# Patient Record
Sex: FEMALE | Race: WHITE | NOT HISPANIC OR LATINO | URBAN - METROPOLITAN AREA
[De-identification: names, ages, dates, MRNs, and addresses within clinical notes are randomized per-mention and may not be internally consistent; named-entity substitution may affect disease eponyms.]

---

## 2020-05-18 ENCOUNTER — EMERGENCY (EMERGENCY)
Facility: HOSPITAL | Age: 70
LOS: 1 days | Discharge: ANOTHER HOSPITAL | End: 2020-05-18
Attending: EMERGENCY MEDICINE | Admitting: EMERGENCY MEDICINE
Payer: MEDICARE

## 2020-05-18 ENCOUNTER — INPATIENT (INPATIENT)
Facility: HOSPITAL | Age: 70
LOS: 6 days | Discharge: NURSING, REHAB OR LONG-TERM CARE | DRG: 314 | End: 2020-05-25
Attending: FAMILY MEDICINE | Admitting: HOSPITALIST
Payer: MEDICARE

## 2020-05-18 VITALS
WEIGHT: 190.04 LBS | HEART RATE: 84 BPM | RESPIRATION RATE: 18 BRPM | OXYGEN SATURATION: 94 % | HEIGHT: 58 IN | SYSTOLIC BLOOD PRESSURE: 100 MMHG | DIASTOLIC BLOOD PRESSURE: 67 MMHG | TEMPERATURE: 97 F

## 2020-05-18 VITALS
HEART RATE: 80 BPM | TEMPERATURE: 98 F | RESPIRATION RATE: 20 BRPM | SYSTOLIC BLOOD PRESSURE: 94 MMHG | OXYGEN SATURATION: 100 % | DIASTOLIC BLOOD PRESSURE: 56 MMHG

## 2020-05-18 VITALS
HEIGHT: 58 IN | OXYGEN SATURATION: 100 % | WEIGHT: 190.04 LBS | SYSTOLIC BLOOD PRESSURE: 114 MMHG | DIASTOLIC BLOOD PRESSURE: 55 MMHG | TEMPERATURE: 98 F | HEART RATE: 87 BPM | RESPIRATION RATE: 20 BRPM

## 2020-05-18 DIAGNOSIS — R06.02 SHORTNESS OF BREATH: ICD-10-CM

## 2020-05-18 DIAGNOSIS — N17.9 ACUTE KIDNEY FAILURE, UNSPECIFIED: ICD-10-CM

## 2020-05-18 DIAGNOSIS — I49.9 CARDIAC ARRHYTHMIA, UNSPECIFIED: ICD-10-CM

## 2020-05-18 DIAGNOSIS — J44.9 CHRONIC OBSTRUCTIVE PULMONARY DISEASE, UNSPECIFIED: ICD-10-CM

## 2020-05-18 DIAGNOSIS — E11.9 TYPE 2 DIABETES MELLITUS WITHOUT COMPLICATIONS: ICD-10-CM

## 2020-05-18 DIAGNOSIS — J96.01 ACUTE RESPIRATORY FAILURE WITH HYPOXIA: ICD-10-CM

## 2020-05-18 DIAGNOSIS — E87.1 HYPO-OSMOLALITY AND HYPONATREMIA: ICD-10-CM

## 2020-05-18 DIAGNOSIS — I10 ESSENTIAL (PRIMARY) HYPERTENSION: ICD-10-CM

## 2020-05-18 DIAGNOSIS — Z29.9 ENCOUNTER FOR PROPHYLACTIC MEASURES, UNSPECIFIED: ICD-10-CM

## 2020-05-18 DIAGNOSIS — D64.9 ANEMIA, UNSPECIFIED: ICD-10-CM

## 2020-05-18 DIAGNOSIS — I50.9 HEART FAILURE, UNSPECIFIED: ICD-10-CM

## 2020-05-18 DIAGNOSIS — E87.8 OTHER DISORDERS OF ELECTROLYTE AND FLUID BALANCE, NOT ELSEWHERE CLASSIFIED: ICD-10-CM

## 2020-05-18 DIAGNOSIS — R19.7 DIARRHEA, UNSPECIFIED: ICD-10-CM

## 2020-05-18 LAB
ABO RH CONFIRMATION: SIGNIFICANT CHANGE UP
ALBUMIN SERPL ELPH-MCNC: 3.4 G/DL — SIGNIFICANT CHANGE UP (ref 3.3–5)
ALP SERPL-CCNC: 131 U/L — HIGH (ref 30–120)
ALT FLD-CCNC: 14 U/L DA — SIGNIFICANT CHANGE UP (ref 10–60)
ANION GAP SERPL CALC-SCNC: 10 MMOL/L — SIGNIFICANT CHANGE UP (ref 5–17)
ANION GAP SERPL CALC-SCNC: 6 MMOL/L — SIGNIFICANT CHANGE UP (ref 5–17)
APTT BLD: 27.3 SEC — LOW (ref 28.5–37)
AST SERPL-CCNC: 23 U/L — SIGNIFICANT CHANGE UP (ref 10–40)
BASOPHILS # BLD AUTO: 0.03 K/UL — SIGNIFICANT CHANGE UP (ref 0–0.2)
BASOPHILS NFR BLD AUTO: 0.3 % — SIGNIFICANT CHANGE UP (ref 0–2)
BILIRUB SERPL-MCNC: 0.7 MG/DL — SIGNIFICANT CHANGE UP (ref 0.2–1.2)
BLD GP AB SCN SERPL QL: SIGNIFICANT CHANGE UP
BUN SERPL-MCNC: 90 MG/DL — HIGH (ref 7–23)
BUN SERPL-MCNC: 96 MG/DL — HIGH (ref 7–23)
CALCIUM SERPL-MCNC: 9.2 MG/DL — SIGNIFICANT CHANGE UP (ref 8.5–10.1)
CALCIUM SERPL-MCNC: 9.5 MG/DL — SIGNIFICANT CHANGE UP (ref 8.4–10.5)
CHLORIDE SERPL-SCNC: 81 MMOL/L — LOW (ref 96–108)
CHLORIDE SERPL-SCNC: 82 MMOL/L — LOW (ref 96–108)
CO2 SERPL-SCNC: 33 MMOL/L — HIGH (ref 22–31)
CO2 SERPL-SCNC: 37 MMOL/L — HIGH (ref 22–31)
CREAT SERPL-MCNC: 1.93 MG/DL — HIGH (ref 0.5–1.3)
CREAT SERPL-MCNC: 2.2 MG/DL — HIGH (ref 0.5–1.3)
CRP SERPL-MCNC: 0.98 MG/DL — HIGH (ref 0–0.4)
D DIMER BLD IA.RAPID-MCNC: <150 NG/ML DDU — SIGNIFICANT CHANGE UP
EOSINOPHIL # BLD AUTO: 0.04 K/UL — SIGNIFICANT CHANGE UP (ref 0–0.5)
EOSINOPHIL NFR BLD AUTO: 0.4 % — SIGNIFICANT CHANGE UP (ref 0–6)
FERRITIN SERPL-MCNC: 15 NG/ML — SIGNIFICANT CHANGE UP (ref 15–150)
GLUCOSE SERPL-MCNC: 106 MG/DL — HIGH (ref 70–99)
GLUCOSE SERPL-MCNC: 198 MG/DL — HIGH (ref 70–99)
HCT VFR BLD CALC: 23 % — LOW (ref 34.5–45)
HCT VFR BLD CALC: 23.6 % — LOW (ref 34.5–45)
HGB BLD-MCNC: 7.4 G/DL — LOW (ref 11.5–15.5)
HGB BLD-MCNC: 7.4 G/DL — LOW (ref 11.5–15.5)
IMM GRANULOCYTES NFR BLD AUTO: 0.6 % — SIGNIFICANT CHANGE UP (ref 0–1.5)
INR BLD: 1.26 RATIO — HIGH (ref 0.88–1.16)
LACTATE SERPL-SCNC: 1.2 MMOL/L — SIGNIFICANT CHANGE UP (ref 0.7–2)
LYMPHOCYTES # BLD AUTO: 1 K/UL — SIGNIFICANT CHANGE UP (ref 1–3.3)
LYMPHOCYTES # BLD AUTO: 9.3 % — LOW (ref 13–44)
MCHC RBC-ENTMCNC: 25.5 PG — LOW (ref 27–34)
MCHC RBC-ENTMCNC: 25.9 PG — LOW (ref 27–34)
MCHC RBC-ENTMCNC: 31.4 GM/DL — LOW (ref 32–36)
MCHC RBC-ENTMCNC: 32.2 GM/DL — SIGNIFICANT CHANGE UP (ref 32–36)
MCV RBC AUTO: 80.4 FL — SIGNIFICANT CHANGE UP (ref 80–100)
MCV RBC AUTO: 81.4 FL — SIGNIFICANT CHANGE UP (ref 80–100)
MONOCYTES # BLD AUTO: 1.02 K/UL — HIGH (ref 0–0.9)
MONOCYTES NFR BLD AUTO: 9.5 % — SIGNIFICANT CHANGE UP (ref 2–14)
NEUTROPHILS # BLD AUTO: 8.59 K/UL — HIGH (ref 1.8–7.4)
NEUTROPHILS NFR BLD AUTO: 79.9 % — HIGH (ref 43–77)
NRBC # BLD: 0 /100 WBCS — SIGNIFICANT CHANGE UP (ref 0–0)
NRBC # BLD: 0 /100 WBCS — SIGNIFICANT CHANGE UP (ref 0–0)
NT-PROBNP SERPL-SCNC: 5421 PG/ML — HIGH (ref 0–125)
PLATELET # BLD AUTO: 253 K/UL — SIGNIFICANT CHANGE UP (ref 150–400)
PLATELET # BLD AUTO: 280 K/UL — SIGNIFICANT CHANGE UP (ref 150–400)
POTASSIUM SERPL-MCNC: 3 MMOL/L — LOW (ref 3.5–5.3)
POTASSIUM SERPL-MCNC: 3 MMOL/L — LOW (ref 3.5–5.3)
POTASSIUM SERPL-SCNC: 3 MMOL/L — LOW (ref 3.5–5.3)
POTASSIUM SERPL-SCNC: 3 MMOL/L — LOW (ref 3.5–5.3)
PROCALCITONIN SERPL-MCNC: 0.22 NG/ML — HIGH (ref 0.02–0.1)
PROT SERPL-MCNC: 7.5 G/DL — SIGNIFICANT CHANGE UP (ref 6–8.3)
PROTHROM AB SERPL-ACNC: 14.1 SEC — HIGH (ref 10–12.9)
RBC # BLD: 2.86 M/UL — LOW (ref 3.8–5.2)
RBC # BLD: 2.9 M/UL — LOW (ref 3.8–5.2)
RBC # FLD: 15 % — HIGH (ref 10.3–14.5)
RBC # FLD: 15.1 % — HIGH (ref 10.3–14.5)
SODIUM SERPL-SCNC: 124 MMOL/L — LOW (ref 135–145)
SODIUM SERPL-SCNC: 125 MMOL/L — LOW (ref 135–145)
TROPONIN I SERPL-MCNC: 0 NG/ML — LOW (ref 0.02–0.06)
WBC # BLD: 10.72 K/UL — HIGH (ref 3.8–10.5)
WBC # BLD: 10.74 K/UL — HIGH (ref 3.8–10.5)
WBC # FLD AUTO: 10.72 K/UL — HIGH (ref 3.8–10.5)
WBC # FLD AUTO: 10.74 K/UL — HIGH (ref 3.8–10.5)

## 2020-05-18 PROCEDURE — 93005 ELECTROCARDIOGRAM TRACING: CPT

## 2020-05-18 PROCEDURE — 71250 CT THORAX DX C-: CPT | Mod: 26

## 2020-05-18 PROCEDURE — 71045 X-RAY EXAM CHEST 1 VIEW: CPT

## 2020-05-18 PROCEDURE — 99285 EMERGENCY DEPT VISIT HI MDM: CPT | Mod: 25

## 2020-05-18 PROCEDURE — U0003: CPT

## 2020-05-18 PROCEDURE — 83880 ASSAY OF NATRIURETIC PEPTIDE: CPT

## 2020-05-18 PROCEDURE — 80053 COMPREHEN METABOLIC PANEL: CPT

## 2020-05-18 PROCEDURE — 93010 ELECTROCARDIOGRAM REPORT: CPT

## 2020-05-18 PROCEDURE — 85610 PROTHROMBIN TIME: CPT

## 2020-05-18 PROCEDURE — 82728 ASSAY OF FERRITIN: CPT

## 2020-05-18 PROCEDURE — 87040 BLOOD CULTURE FOR BACTERIA: CPT

## 2020-05-18 PROCEDURE — 96360 HYDRATION IV INFUSION INIT: CPT

## 2020-05-18 PROCEDURE — 36415 COLL VENOUS BLD VENIPUNCTURE: CPT

## 2020-05-18 PROCEDURE — 85730 THROMBOPLASTIN TIME PARTIAL: CPT

## 2020-05-18 PROCEDURE — 99285 EMERGENCY DEPT VISIT HI MDM: CPT

## 2020-05-18 PROCEDURE — 99223 1ST HOSP IP/OBS HIGH 75: CPT | Mod: GC,AI

## 2020-05-18 PROCEDURE — 85379 FIBRIN DEGRADATION QUANT: CPT

## 2020-05-18 PROCEDURE — 74176 CT ABD & PELVIS W/O CONTRAST: CPT | Mod: 26

## 2020-05-18 PROCEDURE — 99284 EMERGENCY DEPT VISIT MOD MDM: CPT

## 2020-05-18 PROCEDURE — 86140 C-REACTIVE PROTEIN: CPT

## 2020-05-18 PROCEDURE — 84484 ASSAY OF TROPONIN QUANT: CPT

## 2020-05-18 PROCEDURE — 85027 COMPLETE CBC AUTOMATED: CPT

## 2020-05-18 PROCEDURE — 83605 ASSAY OF LACTIC ACID: CPT

## 2020-05-18 PROCEDURE — 82962 GLUCOSE BLOOD TEST: CPT

## 2020-05-18 PROCEDURE — 71045 X-RAY EXAM CHEST 1 VIEW: CPT | Mod: 26

## 2020-05-18 PROCEDURE — 84145 PROCALCITONIN (PCT): CPT

## 2020-05-18 RX ORDER — DILTIAZEM HCL 120 MG
120 CAPSULE, EXT RELEASE 24 HR ORAL DAILY
Refills: 0 | Status: DISCONTINUED | OUTPATIENT
Start: 2020-05-18 | End: 2020-05-18

## 2020-05-18 RX ORDER — ESOMEPRAZOLE MAGNESIUM 40 MG/1
1 CAPSULE, DELAYED RELEASE ORAL
Qty: 0 | Refills: 0 | DISCHARGE

## 2020-05-18 RX ORDER — FOLIC ACID 0.8 MG
1 TABLET ORAL DAILY
Refills: 0 | Status: DISCONTINUED | OUTPATIENT
Start: 2020-05-18 | End: 2020-05-25

## 2020-05-18 RX ORDER — FOLIC ACID 0.8 MG
1 TABLET ORAL
Qty: 0 | Refills: 0 | DISCHARGE

## 2020-05-18 RX ORDER — POTASSIUM CHLORIDE 20 MEQ
10 PACKET (EA) ORAL
Refills: 0 | Status: COMPLETED | OUTPATIENT
Start: 2020-05-18 | End: 2020-05-19

## 2020-05-18 RX ORDER — INSULIN LISPRO 100/ML
VIAL (ML) SUBCUTANEOUS
Refills: 0 | Status: DISCONTINUED | OUTPATIENT
Start: 2020-05-18 | End: 2020-05-25

## 2020-05-18 RX ORDER — SODIUM CHLORIDE 9 MG/ML
1000 INJECTION, SOLUTION INTRAVENOUS
Refills: 0 | Status: DISCONTINUED | OUTPATIENT
Start: 2020-05-18 | End: 2020-05-25

## 2020-05-18 RX ORDER — POTASSIUM CHLORIDE 20 MEQ
40 PACKET (EA) ORAL ONCE
Refills: 0 | Status: COMPLETED | OUTPATIENT
Start: 2020-05-18 | End: 2020-05-18

## 2020-05-18 RX ORDER — DEXTROSE 50 % IN WATER 50 %
25 SYRINGE (ML) INTRAVENOUS ONCE
Refills: 0 | Status: DISCONTINUED | OUTPATIENT
Start: 2020-05-18 | End: 2020-05-25

## 2020-05-18 RX ORDER — DEXTROSE 50 % IN WATER 50 %
12.5 SYRINGE (ML) INTRAVENOUS ONCE
Refills: 0 | Status: DISCONTINUED | OUTPATIENT
Start: 2020-05-18 | End: 2020-05-25

## 2020-05-18 RX ORDER — DEXTROSE 50 % IN WATER 50 %
15 SYRINGE (ML) INTRAVENOUS ONCE
Refills: 0 | Status: DISCONTINUED | OUTPATIENT
Start: 2020-05-18 | End: 2020-05-25

## 2020-05-18 RX ORDER — GLUCAGON INJECTION, SOLUTION 0.5 MG/.1ML
1 INJECTION, SOLUTION SUBCUTANEOUS ONCE
Refills: 0 | Status: DISCONTINUED | OUTPATIENT
Start: 2020-05-18 | End: 2020-05-25

## 2020-05-18 RX ORDER — SERTRALINE 25 MG/1
1 TABLET, FILM COATED ORAL
Qty: 0 | Refills: 0 | DISCHARGE

## 2020-05-18 RX ORDER — IPRATROPIUM/ALBUTEROL SULFATE 18-103MCG
3 AEROSOL WITH ADAPTER (GRAM) INHALATION EVERY 6 HOURS
Refills: 0 | Status: DISCONTINUED | OUTPATIENT
Start: 2020-05-18 | End: 2020-05-25

## 2020-05-18 RX ORDER — DILTIAZEM HCL 120 MG
1 CAPSULE, EXT RELEASE 24 HR ORAL
Qty: 0 | Refills: 0 | DISCHARGE

## 2020-05-18 RX ORDER — FLUTICASONE FUROATE, UMECLIDINIUM BROMIDE AND VILANTEROL TRIFENATATE 200; 62.5; 25 UG/1; UG/1; UG/1
1 POWDER RESPIRATORY (INHALATION)
Qty: 0 | Refills: 0 | DISCHARGE

## 2020-05-18 RX ORDER — METFORMIN HYDROCHLORIDE 850 MG/1
1 TABLET ORAL
Qty: 0 | Refills: 0 | DISCHARGE

## 2020-05-18 RX ORDER — CETIRIZINE HYDROCHLORIDE 10 MG/1
1 TABLET ORAL
Qty: 0 | Refills: 0 | DISCHARGE

## 2020-05-18 RX ORDER — SODIUM CHLORIDE 9 MG/ML
1000 INJECTION INTRAMUSCULAR; INTRAVENOUS; SUBCUTANEOUS ONCE
Refills: 0 | Status: COMPLETED | OUTPATIENT
Start: 2020-05-18 | End: 2020-05-18

## 2020-05-18 RX ORDER — PANTOPRAZOLE SODIUM 20 MG/1
40 TABLET, DELAYED RELEASE ORAL EVERY 12 HOURS
Refills: 0 | Status: DISCONTINUED | OUTPATIENT
Start: 2020-05-18 | End: 2020-05-25

## 2020-05-18 RX ORDER — DILTIAZEM HCL 120 MG
120 CAPSULE, EXT RELEASE 24 HR ORAL DAILY
Refills: 0 | Status: DISCONTINUED | OUTPATIENT
Start: 2020-05-18 | End: 2020-05-25

## 2020-05-18 RX ORDER — SERTRALINE 25 MG/1
100 TABLET, FILM COATED ORAL DAILY
Refills: 0 | Status: DISCONTINUED | OUTPATIENT
Start: 2020-05-18 | End: 2020-05-25

## 2020-05-18 RX ORDER — CHOLECALCIFEROL (VITAMIN D3) 125 MCG
1000 CAPSULE ORAL DAILY
Refills: 0 | Status: DISCONTINUED | OUTPATIENT
Start: 2020-05-18 | End: 2020-05-22

## 2020-05-18 RX ORDER — FERROUS SULFATE 325(65) MG
325 TABLET ORAL DAILY
Refills: 0 | Status: DISCONTINUED | OUTPATIENT
Start: 2020-05-18 | End: 2020-05-25

## 2020-05-18 RX ORDER — ACETAMINOPHEN 500 MG
650 TABLET ORAL ONCE
Refills: 0 | Status: COMPLETED | OUTPATIENT
Start: 2020-05-18 | End: 2020-05-18

## 2020-05-18 RX ORDER — INSULIN LISPRO 100/ML
VIAL (ML) SUBCUTANEOUS AT BEDTIME
Refills: 0 | Status: DISCONTINUED | OUTPATIENT
Start: 2020-05-18 | End: 2020-05-25

## 2020-05-18 RX ADMIN — Medication 40 MILLIEQUIVALENT(S): at 17:39

## 2020-05-18 RX ADMIN — Medication 100 MILLIEQUIVALENT(S): at 21:32

## 2020-05-18 RX ADMIN — SODIUM CHLORIDE 1000 MILLILITER(S): 9 INJECTION INTRAMUSCULAR; INTRAVENOUS; SUBCUTANEOUS at 17:30

## 2020-05-18 RX ADMIN — SODIUM CHLORIDE 1000 MILLILITER(S): 9 INJECTION INTRAMUSCULAR; INTRAVENOUS; SUBCUTANEOUS at 16:30

## 2020-05-18 RX ADMIN — PANTOPRAZOLE SODIUM 40 MILLIGRAM(S): 20 TABLET, DELAYED RELEASE ORAL at 21:32

## 2020-05-18 RX ADMIN — Medication 650 MILLIGRAM(S): at 17:39

## 2020-05-18 RX ADMIN — Medication 20 MILLIGRAM(S): at 22:29

## 2020-05-18 RX ADMIN — Medication 0: at 21:35

## 2020-05-18 NOTE — H&P ADULT - NSICDXPASTMEDICALHX_GEN_ALL_CORE_FT
PAST MEDICAL HISTORY:  COPD (Chronic Obstructive Pulmonary Disease)     COPD with hypoxia     Diabetes Mellitus Type II     History of Cholecystectomy     HTN (Hypertension)     Hypertension     Obstructive Sleep Apnea     Pulmonary hypertension     Type 2 diabetes mellitus

## 2020-05-18 NOTE — H&P ADULT - PROBLEM SELECTOR PLAN 7
Chronic HFpEF, with ?pulm HTN found on CT  - Continue Torsemide 20 mg TID, will hold tomorrow if Cr worsens as d/w Dr Staton  - Continue Sildenafil  - Continue Cardizem  - Hold metolazone and spironolactone per Dr Staton  - Last documented TTE 5/2012 on our records; f/u echo  - Cardio (Dr Staton) consulted, recs appreciated

## 2020-05-18 NOTE — H&P ADULT - PROBLEM SELECTOR PLAN 9
Hx of "arrhythmia" - pt/daughter do not know which type, ?Afib  - Continue Cardizem  - Monitor on tele  - Cardio (Dr Staton) consulted

## 2020-05-18 NOTE — H&P ADULT - HISTORY OF PRESENT ILLNESS
70 year old Female with PMHx of COPD (on home O2 via NC), chronic HFpEF, HTN, DM2 who presented with c/o SOB x3 days, +non productive cough and chronic lower extremity edema. She denies fevers/chills, no known COVID exposures, chest pain, abdominal pain, v/d, chest pain, palpitations.     [facility] [phone]   Torsemide 40 tid  Esomeprazole 40mg daily, FA 1tab, 325mg Fe daily, Metformin 500mg ER daily, MVI, vitamin D 1000mg daily, Zyrtec   Zoloft 100mg daily  Metalazone 2.5mg daily   Spironolactone 25mg daily   sildenafil 150mg  diltiazem 120mg daily   Combivent  Singulair 2014     Called Daughter on phone    Symptoms started about 2 to 3 weeks ago. Patient became more short of breath to where she her legs were swollen ,left breast tissue swollen, with overall tense swollen sign."filled with water". Daughters called her cardiology, prescribed a second diuretic metazolone along with torsemide 20 mg TID.   As of recent patient has been very lethargic, no energy, + chest discomfort. Continued to be progressively more dyspneic. Daughter gave her nasal saline for breathing. not  effective. her sugars were checked, overal elevated in the low 200s    2 days ago, daughter called her cardiologist, [place] patient was over diuresed and stopped her diuretics for the past two days.    Patient was transferred from [place] ED on 5/18 with the following vitals:   T: 97.4, HR 84, /67, RR: 18, SpO2 94% on non-rebreather.  CBC significant for Hgb 7.4 (in 2014 hgb 13.2), D-dimer <150. INR/PT/Ptt 1.26/14.1/27.3. CMP sig for Na 124-->125 s/p 1L NS at [facility] ED, K 3.0 (3.0 in pl[facility] ED s/p 40KCl PO x1), Cl 81, CO2 37, BUN/Cr 96/1.93-->90/2.20 (in [facility] ED). Trops x 1 negative. Serum BNP 5421 5421. Lactate negative  CXR: The lungs are clear. The heart size is mildly enlarged. Multilevel degenerative changes are noted within the imaged potions of the spine.  EKG:   COVID 19 PCR in process  Blood culture, CRP and Procalcitonin are in process  Meds given: tylenol 650, NS 1 liter bolus, 40KCl PO 70 year old Female with PMHx of COPD (on home O2 via NC 5-6 L), chronic HFpEF (last documented TTE 5/2012), HTN, DM2, Mood disorder who presented with c/o SOB x3 days, +non productive cough and chronic lower extremity edema. She denies fevers/chills, no known COVID exposures, chest pain, abdominal pain, v/d, chest pain, palpitations.     [facility] [phone]   Torsemide 40 tid  Esomeprazole 40mg daily, FA 1tab, 325mg Fe daily, Metformin 500mg ER daily, MVI, vitamin D 1000mg daily, Zyrtec   Zoloft 100mg daily  Metolazone 2.5mg daily   Spironolactone 25mg daily   sildenafil 150mg  diltiazem 120mg daily   Combivent  Singulair 2014     Called Daughter on phone    Symptoms started about 2 to 3 weeks ago. Patient became more short of breath to where she her legs were swollen ,left breast tissue swollen, with overall tense swollen sign."filled with water". Daughters called her cardiology, prescribed a second diuretic metazolone along with torsemide 20 mg TID.   As of recent patient has been very lethargic, no energy, + chest discomfort. Continued to be progressively more dyspneic. Daughter gave her nasal saline for breathing. not  effective. her sugars were checked, overal elevated in the low 200s    2 days ago, daughter called her cardiologist, [place] patient was over diuresed and stopped her diuretics for the past two days.    Patient was transferred from [place] ED on 5/18 with the following vitals:   T: 97.4, HR 84, /67, RR: 18, SpO2 94% on non-rebreather.  CBC significant for Hgb 7.4 (in 2014 hgb 13.2), D-dimer <150. INR/PT/Ptt 1.26/14.1/27.3. CMP sig for Na 124-->125 s/p 1L NS at [facility] ED, K 3.0 (3.0 in plv ED s/p 40KCl PO x1), Cl 81, CO2 37, BUN/Cr 96/1.93-->90/2.20 (in pl[facility] ED). Trops x 1 negative. Serum BNP 5421 5421. Lactate negative  CXR: The lungs are clear. The heart size is mildly enlarged. Multilevel degenerative changes are noted within the imaged potions of the spine.  EKG:   COVID 19 PCR in process  Blood culture, CRP and Procalcitonin are in process  Meds given: tylenol 650, NS 1 liter bolus, 40KCl PO 70 year old Female with PMHx of COPD (on home O2 via NC 5-6 L), chronic HFpEF (last documented TTE 5/2012), HTN, DM2, anxiety who presented with c/o SOB  and fatigue for four days. Patient also describes having a cough productive of white sputum, worsening lower extremity edema, left chest discomfort, and nausea. Every day her symptoms get worse, and are especially bad when she is trying to get around the house. Patient is on 5-6L O2 at home typically saturating in the low 90s. Daughter gave her nasal saline for breathing, which was not effective. With worsening SOB and LE edema, daughters called her cardiologist who prescribed a second diuretic metazolone along with torsemide 20 mg TID. Two days ago, daughter called her cardiologist, [place] patient was over diuresed and stopped her diuretics for the past two days. She denies fevers/chills, headache, vomiting, sick contacts.         Patient was transferred from [facility] ED on 5/18 with the following vitals:   T: 97.4, HR 84, /67, RR: 18, SpO2 94% on non-rebreather.  CBC significant for Hgb 7.4 (in 2014 hgb 13.2), D-dimer <150. INR/PT/Ptt 1.26/14.1/27.3. CMP sig for Na 124-->125 s/p 1L NS at [facility] ED, K 3.0 (3.0 in plv ED s/p 40KCl PO x1), Cl 81, CO2 37, BUN/Cr 96/1.93-->90/2.20 (in plv ED). Trops x 1 negative. Serum BNP 5421 5421. Lactate negative  CXR: The lungs are clear. The heart size is mildly enlarged. Multilevel degenerative changes are noted within the imaged potions of the spine.  EKG:   COVID 19 PCR in process  Blood culture, CRP and Procalcitonin are in process  Meds given: tylenol 650, NS 1 liter bolus, 40KCl PO 70 year old Female with PMHx of COPD (on home O2 via NC 5-6 L), chronic HFpEF (last documented TTE 5/2012), HTN, DM2, anxiety who presented with c/o SOB  and fatigue for four days. Patient also describes having a cough productive of white sputum, worsening lower extremity edema, left chest discomfort, and nausea. Every day her symptoms get worse, and are especially bad when she is trying to get around the house. Patient is on 5-6L O2 at home typically saturating in the low 90s. Daughter gave her nasal saline for breathing, which was not effective. With worsening SOB and LE edema, daughters called her cardiologist who prescribed a second diuretic metazolone along with torsemide 20 mg TID. Two days ago, daughter called her cardiologist, [place] patient was over diuresed and stopped her diuretics for the past two days. She denies fevers/chills, headache, vomiting, sick contacts.         Patient was transferred from [place] ED on 5/18 with the following vitals:   T: 97.4, HR 84, /67, RR: 18, SpO2 94% on non-rebreather.  CBC significant for Hgb 7.4 (in 2014 hgb 13.2), D-dimer <150. INR/PT/Ptt 1.26/14.1/27.3. CMP sig for Na 124-->125 s/p 1L NS at [place] ED, K 3.0 (3.0 in plv ED s/p 40KCl PO x1), Cl 81, CO2 37, BUN/Cr 96/1.93-->90/2.20 (in plv ED). Trops x 1 negative. Serum BNP 5421 5421. Lactate negative  CXR: The lungs are clear. The heart size is mildly enlarged. Multilevel degenerative changes are noted within the imaged potions of the spine.  EKG: NSR with RBBB per cardio note  COVID 19 PCR in process  Blood culture, CRP and Procalcitonin are in process  Meds given: tylenol 650, NS 1 liter bolus, 40KCl PO

## 2020-05-18 NOTE — ED PROVIDER NOTE - ATTENDING CONTRIBUTION TO CARE
70 female bIBA for eval of weakness, dry cough and watery diarrhea for several days. Denies contact with covid pts. EMS states pt hypoxic on room air. Sats 100% on NRB. PE afebrile, scattered rhonchi, trace pitting edema in ankles. Abd soft.   PCP Mendelson  Plan - COVID workup.    I performed a history and physical exam of the patient and discussed their management with the advanced care provider. I reviewed the advanced care provider's note and agree with the documented findings and plan of care. My medical decision making and objective findings are found above.

## 2020-05-18 NOTE — H&P ADULT - PROBLEM SELECTOR PLAN 5
?Mild pancreatitis on CT abdomen/pelvis with abdominal discomfort on exam  - F/u lipase ?Mild pancreatitis on CT abdomen/pelvis with abdominal discomfort on exam  - lipase 91 making pancreatitis unlikely.

## 2020-05-18 NOTE — ED PROVIDER NOTE - CARE PLAN
Principal Discharge DX:	Hyponatremia  Secondary Diagnosis:	MAURICIO (acute kidney injury)  Secondary Diagnosis:	Hypokalemia  Secondary Diagnosis:	Anemia  Secondary Diagnosis:	Hypoxia

## 2020-05-18 NOTE — H&P ADULT - NSHPOUTPATIENTPROVIDERS_GEN_ALL_CORE
Pulmonologist:   Cardiologist: Dr. Mendelson in [facility] PCP: Alejandro  Cardiologist: Dr. Mendelson in [facility]

## 2020-05-18 NOTE — H&P ADULT - ASSESSMENT
70 year old Female with PMHx of COPD (not on home O2), HTN, DM2 who presented with c/o SOB x3 days, +non productive cough and chronic lower extremity edema, admitted for acute hypoxemic respiratory failure 2/2 COPD exacerbation vs. volume overload from acute diastolic heart failure vs. symptomatic anemia, complicated by MAURICIO, hyponatremia.     Impression  Hypoxemic Respiratory Failure ddx include COPD exacerbation vs symptomatic anemia vs congestive Heart failurer  r/o COVID 19 PNA  CXR clear  COVID in process  on nonrebreather  plan:  urine antigens  start Ceftriaxone and doxycycline  start albuterol inhaler  start IV steroids  ICU evaluation  CT Chest non con now  venous duplex  trend trops  echocardiogram    Cough / Diarrhea r/o viral Illness r/o Cdiff  COVID pending  check viral PCR / FLU  check cdiff and GI PCR    Anemia likely to Iron Defiency vs ?hemmorhoidal  As per daughter, aid reports blood in patients stool.  Patients physicians reports she has chronic rectal bleeding  may be contributing to hypoxemia  PRBC ordered  active type and cross  Guiac now    Hyponatremia ddx include SIADH vs Dehydration  S/p 1L bolus of normal saline  Repeat Chemistrie  urine lytes    MAURICIO likely prerenal azotemia  bladder renal sono  avoid nephrotoxic drugs    Code: full  diet: NPO while on nonBreather 70 year old Female with PMHx of COPD (on home O2 via NC 5-6 L), chronic HFpEF (last documented TTE 5/2012), HTN, DM2, Mood disorder who presented with c/o SOB x3 days, +non productive cough and chronic lower extremity edema, admitted for acute hypoxemic respiratory failure 2/2 COPD exacerbation vs. volume overload from acute diastolic heart failure vs. symptomatic anemia, complicated by MAURICIO, hyponatremia.     Impression  Hypoxemic Respiratory Failure ddx include COPD exacerbation vs symptomatic anemia vs congestive Heart failurer  r/o COVID 19 PNA  CXR clear  COVID in process  on nonrebreather  plan:  urine antigens  start Ceftriaxone and doxycycline  start albuterol inhaler  start IV steroids  ICU evaluation  CT Chest non con now  venous duplex  trend trops  echocardiogram    Cough / Diarrhea r/o viral Illness r/o Cdiff  COVID pending  check viral PCR / FLU  check cdiff and GI PCR    Anemia likely to Iron Defiency vs ?hemmorhoidal  As per daughter, aid reports blood in patients stool.  Patients physicians reports she has chronic rectal bleeding  may be contributing to hypoxemia  PRBC ordered  active type and cross  Guiac now    Hyponatremia ddx include SIADH vs Dehydration  S/p 1L bolus of normal saline  Repeat Chemistrie  urine lytes    MAURICIO likely prerenal azotemia  bladder renal sono  avoid nephrotoxic drugs    Code: full  diet: NPO while on nonBreather 70 year old Female with PMHx of COPD (on home O2 via NC 5-6 L), chronic HFpEF (last documented TTE 5/2012), HTN, DM2, Mood disorder who presented with c/o SOB x3 days, +non productive cough and chronic lower extremity edema, admitted for acute hypoxemic respiratory failure 2/2 COPD exacerbation vs. volume overload from acute diastolic heart failure vs. symptomatic anemia, complicated by MAURICIO, hyponatremia.     Impression  Hypoxemic Respiratory Failure ddx include COPD exacerbation vs symptomatic anemia vs congestive Heart failure  r/o COVID 19 PNA  CXR clear  COVID in process  on nonrebreather  plan:  urine antigens  start Ceftriaxone and doxycycline  start albuterol inhaler  start IV steroids  ICU evaluation  CT Chest non con now  venous duplex  trend trops  echocardiogram    Cough / Diarrhea r/o viral Illness r/o Cdiff  COVID pending  check viral PCR / FLU  check cdiff and GI PCR    Anemia likely to Iron Defiency vs ?hemmorhoidal  As per daughter, aid reports blood in patients stool.  Patients physicians reports she has chronic rectal bleeding  may be contributing to hypoxemia  PRBC ordered  active type and cross  Guiac now    Hyponatremia ddx include SIADH vs Dehydration  S/p 1L bolus of normal saline  Repeat Chemistrie  urine lytes    MAURICIO likely prerenal azotemia  bladder renal sono  avoid nephrotoxic drugs    Code: full  diet: NPO while on nonBreather 70 year old Female with PMHx of COPD (on home O2 via NC 5-6 L), chronic HFpEF (last documented TTE 5/2012), HTN, DM2, Mood disorder who presented with c/o SOB x3 days, +non productive cough and chronic lower extremity edema, admitted for acute hypoxemic respiratory failure 2/2 COPD exacerbation vs. volume overload from acute diastolic heart failure vs. symptomatic anemia, complicated by MAURICIO, hyponatremia.

## 2020-05-18 NOTE — H&P ADULT - NSHPPHYSICALEXAM_GEN_ALL_CORE
T(C): 36.4 (05-18-20 @ 19:27), Max: 36.4 (05-18-20 @ 19:27)  HR: 87 (05-18-20 @ 19:27) (87 - 87)  BP: 114/55 (05-18-20 @ 19:27) (114/55 - 114/55)  RR: 20 (05-18-20 @ 19:27) (20 - 20)  SpO2: 100% (05-18-20 @ 19:27) (100% - 100%)    GENERAL: patient appears well, no acute distress, appropriate, pleasant  EYES: sclera clear, no exudates  ENMT: oropharynx clear without erythema, no exudates, moist mucous membranes  NECK: supple, soft, no thyromegaly noted  LUNGS: good air entry bilaterally, clear to auscultation, symmetric breath sounds, no wheezing or rhonchi appreciated  HEART: soft S1/S2, regular rate and rhythm, no murmurs noted, no lower extremity edema  GASTROINTESTINAL: abdomen is soft, nontender, nondistended, normoactive bowel sounds, no palpable masses  INTEGUMENT: good skin turgor, no lesions noted  MUSCULOSKELETAL: no clubbing or cyanosis, no obvious deformity  NEUROLOGIC: awake, alert, oriented x3, good muscle tone in 4 extremities, no obvious sensory deficits  PSYCHIATRIC: mood is good, affect is congruent, linear and logical thought process  HEME/LYMPH: no palpable supraclavicular nodules, no obvious ecchymosis or petechiae T(C): 36.4 (05-18-20 @ 19:27), Max: 36.4 (05-18-20 @ 19:27)  HR: 87 (05-18-20 @ 19:27) (87 - 87)  BP: 114/55 (05-18-20 @ 19:27) (114/55 - 114/55)  RR: 20 (05-18-20 @ 19:27) (20 - 20)  SpO2: 100% (05-18-20 @ 19:27) (100% - 100%)    GENERAL: Ill appearing. no acute distress, pleasant  EYES: sclera clear, no exudates  ENMT: oropharynx clear without erythema, no exudates, moist mucous membranes  NECK: supple, soft, no thyromegaly noted  LUNGS: Clear to auscultation, symmetric breath sounds, no wheezing, rales, or rhonchi appreciated  HEART: soft S1/S2, regular rate and rhythm, no murmurs noted, no lower extremity edema  GASTROINTESTINAL: abdomen is soft, mildly tender in epigastric region, distended.   INTEGUMENT: good skin turgor, no lesions noted  MUSCULOSKELETAL: no clubbing or cyanosis, no obvious deformity. 3+ pitting edema in lower extremities bilaterally  NEUROLOGIC: awake, alert, oriented x3, good muscle tone in 4 extremities, no obvious sensory deficits  PSYCHIATRIC: mood is good, affect is congruent, linear and logical thought process  HEME/LYMPH: no palpable supraclavicular nodules, no obvious ecchymosis or petechiae

## 2020-05-18 NOTE — ED PROVIDER NOTE - PHYSICAL EXAMINATION
Constitutional: Awake, Alert, non-toxic. NAD. Well appearing, well nourished.   HEAD: Normocephalic, atraumatic.   EYES: EOM intact, conjunctiva and sclera are clear bilaterally.   ENT: No rhinorrhea, patent, mucous membranes pink/moist, no drooling or stridor.   NECK: Supple, non-tender  CARDIOVASCULAR: Normal S1, S2; regular rate and rhythm.  RESPIRATORY: (+) B/L rales. no wheezing, normal effort, no accessory muscle use, speaking in full sentences  ABDOMEN: Soft; non-tender, non-distended.   EXTREMITIES: Full passive and active ROM in all extremities; non-tender to palpation; distal pulses palpable and symmetric, (+) B/L +1 pitting edema, negative anahy sign.   SKIN: Warm, dry; good skin turgor, no apparent lesions or rashes, no ecchymosis, brisk capillary refill.  NEURO: A&O x3. Sensory and motor functions are grossly intact. Speech is normal. Appearance and judgement seem appropriate for gender and age.

## 2020-05-18 NOTE — H&P ADULT - PROBLEM SELECTOR PLAN 4
MAURICIO likely prerenal azotemia  bladder renal sono  avoid nephrotoxic drugs MAURICIO likely prerenal azotemia  bladder renal sono  avoid nephrotoxic drugs. Hyponatremic and hypokalemic  - DDx for hyponatremia includes SIADH vs Dehydration  - S/p 1L bolus of normal saline, KCl 40 in [place]  - K riders 10 mEq x3 ordered as K is still 3 s/p repletion with PO  - F/u metabolic panel in AM  - Urine lytes  - Nephro (Dr Goldsmith) consulted

## 2020-05-18 NOTE — ED PROVIDER NOTE - OBJECTIVE STATEMENT
71 yo F w hx COPD, HTN, DM p/w co feeling SOB x past ~ 3 days. SOme cough, non-prod. NO fever/chills. NO known COVID exposures. no chest pain. no abd pain. no v/d. pt with chronic LE edema as well. No recent acute changes. No cp/palp. no other acute co.

## 2020-05-18 NOTE — ED PROVIDER NOTE - PROGRESS NOTE DETAILS
pt doing well currently, O2 100%. discussed with Dr. Saravia and Dr. Hartman for admission to [facility]

## 2020-05-18 NOTE — H&P ADULT - PROBLEM SELECTOR PLAN 8
Chronic, on home O2 via NC 5-6 L  - On Trelegy at home however not on formulary and pt is not wheezing on exam  - Start Duonebs here for now

## 2020-05-18 NOTE — ED PROVIDER NOTE - PMH
COPD (Chronic Obstructive Pulmonary Disease)    Diabetes Mellitus Type II    History of Cholecystectomy    HTN (Hypertension)    Obstructive Sleep Apnea    Pulmonary hypertension

## 2020-05-18 NOTE — H&P ADULT - PROBLEM SELECTOR PLAN 3
Hyponatremia ddx include SIADH vs Dehydration  S/p 1L bolus of normal saline  Repeat Chemistrie  urine lytes Hyponatremia ddx include SIADH vs Dehydration  S/p 1L bolus of normal saline  Repeat Chemistrie  urine lytes. Diarrhea r/o COVID, r/o Cdiff  - COVID pending  - Check Cdiff and GI PCR  - CT abdomen/pelvis shows possible mild pancreatitis Diarrhea r/o COVID, r/o Cdiff  - COVID pending  - Check Cdiff and GI PCR  - CT abdomen/pelvis shows possible mild pancreatitis - Lipase 91 making pancreatitis unlikely. Diarrhea r/o COVID  - COVID pending  - CT abdomen/pelvis shows possible mild pancreatitis - Lipase 91 making pancreatitis unlikely.

## 2020-05-18 NOTE — H&P ADULT - ATTENDING COMMENTS
Patient seen and examined at bedside. Case discussed with Dr. De La Cruz and Dr Baldwin    Patient alert oriented. initially 100 percent on 8 liters of nonbreather, now switched to ventimask, personallly    on venti  patient satting at 94 percent.    noted CT chest and Ct abdomen. CT chest +++PULM HTN , belly scan negative for bleed, although suspicious for possible mild pancreatitis    After patient received one liter bolus of NS, patients sodium improved to 125 however worsening to 2.2. on POCUS, patients IVC was compressible <50 percent.     on further exam  decreased breath sounds  bilateral leg swelling    plan:  at this time will give patient  one unit of pRBC with diuresis.  ICU called to eval - deemed stable for tele at this time  Check urine lytes and bladder renal sono  check echo and probnp   hold ace and metazolone  Protonix IV q12h  Check cdiff  followup COVID  GI and Renal consult  Repeat Chem and CBC 2 hours after prbc given. Patient seen and examined at bedside. Case discussed with Dr. De La Cruz and Dr Baldwin    Patient alert oriented. initially 100 percent on 8 liters of nonbreather, now switched to ventimask, personallly    on venti  patient satting at 94 percent.    noted CT chest and Ct abdomen. CT chest +++PULM HTN , belly scan negative for bleed, although suspicious for possible mild pancreatitis    After patient received one liter bolus of NS, patients sodium improved to 125 however worsening to 2.2. on POCUS, patients IVC was compressible <50 percent.     on further exam  decreased breath sounds  bilateral leg swelling    plan:  at this time will give patient  one unit of pRBC with diuresis.  ICU called to eval - deemed stable for tele at this time  Check urine lytes and bladder renal sono  check echo and probnp   hold ace and metazolone  Protonix IV q12h  Check cdiff  followup COVID  GI and Renal consult  Repeat Chem and CBC 2 hours after prbc given.  fluid restriction  urine lytes  viral panel  increase Sodium no more than 8 meq in 24 hours

## 2020-05-18 NOTE — H&P ADULT - PROBLEM SELECTOR PLAN 1
Hypoxemic Respiratory Failure ddx include COPD exacerbation vs symptomatic anemia vs congestive Heart failurer  r/o COVID 19 PNA  CXR clear  COVID in process  on nonrebreather  urine antigens  start Ceftriaxone and doxycycline  start albuterol inhaler  start IV steroids  f/u CT Chest non con, f/u venous duplex, trend trops, f/u echocardiogram  NPO while on non-rebreather  Full Code Hypoxemic Respiratory Failure ddx include COPD exacerbation vs symptomatic anemia vs congestive Heart failure  r/o COVID 19 PNA  CXR clear  COVID in process  on nonrebreather  urine antigens  start Ceftriaxone and doxycycline  start albuterol inhaler  start IV steroids  f/u CT Chest non con, f/u venous duplex, trend trops, f/u echocardiogram  NPO while on non-rebreather  Full Code. Hypoxemic Respiratory Failure - DDx includes symptomatic anemia vs CHF, ?pulm HTN vs COVID 19 PNA vs COPD exacerbation  - Admit to tele  - CXR clear; CT chest with possible pulm HTN  - Continue O2 support; currently on NRB  - Start Duonebs; on Trelegy at home however not on formulary   - Consider starting IV steroids  - F/u COVID-19 PCR, urine antigens  - F/u venous duplex, echo Hypoxemic Respiratory Failure - DDx includes symptomatic anemia vs CHF, ?pulm HTN vs COVID 19 PNA vs COPD exacerbation  - Admit to tele  - CXR clear; CT chest with possible pulm HTN  - Continue O2 support; currently on ventimask saturating in mid 90s.  - Start Duonebs; on Trelegy at home however not on formulary   - Consider starting IV steroids  - F/u COVID-19 PCR, urine antigens  - F/u venous duplex, echo

## 2020-05-18 NOTE — H&P ADULT - PROBLEM SELECTOR PLAN 10
10. DVT PPx: HSQ given renal function    11. DM2:  - Hold oral hypoglycemic med: Metformin  - Insulin Corrective Scale with Accuchecks per routine, hypoglycemia protocol  - Consistent Carb Diet, Hemoglobin A1c in AM    12. GOC: Full code 10. DVT PPx: SCDs ordered  Chemical PPx held in setting of ?bleed    11. DM2:  - Hold oral hypoglycemic med: Metformin  - Insulin Corrective Scale with Accuchecks per routine, hypoglycemia protocol  - Consistent Carb Diet, Hemoglobin A1c in AM    12. GOC: Full code

## 2020-05-18 NOTE — ED ADULT NURSE NOTE - OBJECTIVE STATEMENT
Received patient in bed 1. AOX4. transfer from [facility] for admission for SOB, hyponatremia, anemia, hypoxia. r/o COVID   69 yo F w hx COPD, HTN, DM p/w co feeling SOB x past ~ 3 days. SOme cough, non-prod. NO fever/chills. NO known COVID exposures. no chest pain. no abd pain. no v/d. pt with chronic LE edema as well. No recent acute changes. No cp/palp. no other acute co. Cardiac monitor in place. External incontinent device applied. Pt resting comfortably at this time. No s/s of pain noted. O2 8L NRB in place. Awaiting to be seen Received patient in bed 1. AOX4. transfer from [facility] for admission for SOB, hyponatremia, anemia, hypoxia. r/o COVID   71 yo F w hx COPD, HTN, DM p/w co feeling SOB x past ~ 3 days. SOme cough, non-prod. NO fever/chills. NO known COVID exposures. no chest pain. no abd pain. no v/d. pt with chronic LE edema as well. No recent acute changes. No cp/palp. no other acute co. Presented to [place] ED via amb from home. Pt c/o weakness, tenderness over left upper chest which she states she always has & SOB which she always has. O2 6L @ home 24/7. pt reports productive cough and multiple episodes of diarrhea.  pt denies fever, chills, chest pain, palpitations, N/V, hemoptysis, recent travel/surgery, abd pain, recent abx, or any other complaints. (+) former smoker    Denies any change in her breathing but states she is expectorating clear phlegm. Denies fever. Color fair. Skin warm & dry to touch. Lungs- clear- diminished @ bases. Bilat lower extremities swollen which is her norm. Sacrum- reddened- no breakdown.   PCP Mendelson

## 2020-05-18 NOTE — ED ADULT NURSE REASSESSMENT NOTE - NS ED NURSE REASSESS COMMENT FT1
Blood transfusion in progress as ordered. No s/s of RXN noted at this time after 15mins with this RN at bedside. Verbalized understanding of s/s of transfusion RXN. Will continue to monitor as per protocol. Pt resting comfortably at this time. No s/s of pain noted.

## 2020-05-18 NOTE — CONSULT NOTE ADULT - SUBJECTIVE AND OBJECTIVE BOX
History of Present Illness: The patient is a 70 year old female with a history of HTN, DM, chronic diastolic heart failure, COPD who presents with shortness of breath. She has had worsening shortness of breath over the past few days. It is associated with cough productive of clear sputum. No fevers, chest pain, dizziness, palpitations. Her legs have been swollen chronically. She states she is on three diuretics; one of them was recently added.    Past Medical/Surgical History:  HTN, DM, chronic diastolic heart failure, COPD    Medications:  Home Medications:  Cardizem 120 mg oral tablet: 1 tab(s) orally once a day (18 May 2020 17:16)  esomeprazole 40 mg oral delayed release capsule: 1 cap(s) orally once a day (18 May 2020 17:16)  folic acid: 1 tab(s) orally once a day (18 May 2020 17:16)  iron: 325 milligram(s) orally once a day (18 May 2020 17:16)  metFORMIN 500 mg oral tablet, extended release: 1 tab(s) orally once a day (18 May 2020 17:16)  metOLazone 2.5 mg oral tablet: 1 tab(s) orally once a day (18 May 2020 17:16)  multivitamin: 1 tab(s) orally once a day (18 May 2020 17:16)  sildenafil: 150 milligram(s) orally 3 times a day (18 May 2020 17:16)  spironolactone 25 mg oral tablet: 1 tab(s) orally once a day (18 May 2020 17:16)  torsemide: 40 milligram(s) orally 3 times a day (18 May 2020 17:16)  Vitamin D: 1000 milligram(s) orally once a day (18 May 2020 17:16)  Zoloft 100 mg oral tablet: 1 tab(s) orally once a day (18 May 2020 17:16)  ZyrTEC: 1 tab(s) orally once a day (18 May 2020 17:16)      Family History: Non-contributory family history of premature cardiovascular atherosclerotic disease    Social History: No tobacco, alcohol or drug use    Review of Systems:  General: No fevers, chills, weight loss or gain  Skin: No rashes, color changes  Cardiovascular: No chest pain, orthopnea  Respiratory: No shortness of breath, cough  Gastrointestinal: No nausea, abdominal pain  Genitourinary: No incontinence, pain with urination  Musculoskeletal: No pain, swelling, decreased range of motion  Neurological: No headache, weakness  Psychiatric: No depression, anxiety  Endocrine: No weight loss or gain, increased thirst  All other systems are comprehensively negative.    Physical Exam:  Vitals:        Vital Signs Last 24 Hrs  T(C): 36.7 (18 May 2020 18:57), Max: 37 (18 May 2020 16:30)  T(F): 98.1 (18 May 2020 18:57), Max: 98.6 (18 May 2020 16:30)  HR: 80 (18 May 2020 18:57) (80 - 84)  BP: 94/56 (18 May 2020 18:57) (92/66 - 100/67)  BP(mean): --  RR: 20 (18 May 2020 18:57) (18 - 20)  SpO2: 100% (18 May 2020 18:57) (94% - 100%)  General: NAD  HEENT: MMM  Neck: No JVD, no carotid bruit  Lungs: CTAB  CV: RRR, nl S1/S2, no M/R/G  Abdomen: S/NT/ND, +BS  Extremities: 2+ LE edema, no cyanosis  Neuro: AAOx3, non-focal  Skin: No rash    Labs:                        7.4    10.74 )-----------( 280      ( 18 May 2020 16:45 )             23.6     05-18    124<L>  |  81<L>  |  96<H>  ----------------------------<  106<H>  3.0<L>   |  37<H>  |  1.93<H>    Ca    9.5      18 May 2020 16:45    TPro  7.5  /  Alb  3.4  /  TBili  0.7  /  DBili  x   /  AST  23  /  ALT  14  /  AlkPhos  131<H>  05-18    CARDIAC MARKERS ( 18 May 2020 16:45 )  .000 ng/mL / x     / x     / x     / x          PT/INR - ( 18 May 2020 16:45 )   PT: 14.1 sec;   INR: 1.26 ratio         PTT - ( 18 May 2020 16:45 )  PTT:27.3 sec    ECG: NSR, RBBB

## 2020-05-18 NOTE — ED PROVIDER NOTE - PROGRESS NOTE DETAILS
Dw Dr Hartman, aware of pt, will see pt to admit. FABRIZIO Staton was consulted as well as Dr Friedman from [place]

## 2020-05-18 NOTE — ED PROVIDER NOTE - CARE PLAN
Principal Discharge DX:	Shortness of breath  Secondary Diagnosis:	Upper respiratory tract infection, unspecified type  Secondary Diagnosis:	Hyponatremia  Secondary Diagnosis:	Anemia, unspecified type

## 2020-05-18 NOTE — ED PROVIDER NOTE - OBJECTIVE STATEMENT
69 y/o female with PMHx HTN, DM, COPD BIBA from home due to weakness and SOB x 4 days. pt reports worsening SOB with household activities. Reports no sick contacts or COVID exposure. pt reports elevated blood sugar, highest reading 216. pt admits to chronic LE swelling without acute change. pt reports productive cough and multiple episodes of diarrhea.  pt denies fever, chills, chest pain, palpitations, N/V, hemoptysis, recent travel/surgery, abd pain, recent abx, or any other complaints. (+) former smoker   PCP Mendelson

## 2020-05-18 NOTE — H&P ADULT - PROBLEM SELECTOR PLAN 2
Diarrhea r/o viral Illness r/o Cdiff  COVID pending  check viral PCR / FLU  check cdiff and GI PCR Diarrhea r/o viral Illness r/o Cdiff  COVID pending  check viral PCR / FLU  check cdiff and GI PCR. Anemia likely 2/2 GIB - Iron Deficiency vs ?hemorrhoidal, may be contributing to hypoxemia  - As per daughter, aid reports blood in patients stool.  - Continue home iron and folic acid  - Patient's physician reports she has chronic rectal bleeding  - PRBC ordered  - Active type and cross  - Guiac ordered Anemia likely 2/2 GIB - Iron Deficiency vs ?hemorrhoidal, may be contributing to hypoxemia  - As per daughter, aid reports blood in patients stool.  - Continue home iron and folic acid  - Patient's physician reports she has chronic rectal bleeding  - PRBC ordered  - Active type and cross  - Guiac ordered  - Daughter is able to access past labwork - call in AM to find out baseline Hgb.

## 2020-05-18 NOTE — ED ADULT NURSE NOTE - NSIMPLEMENTINTERV_GEN_ALL_ED
Implemented All Fall with Harm Risk Interventions:  [place] to call system. Call bell, personal items and telephone within reach. Instruct patient to call for assistance. Room bathroom lighting operational. Non-slip footwear when patient is off stretcher. Physically safe environment: no spills, clutter or unnecessary equipment. Stretcher in lowest position, wheels locked, appropriate side rails in place. Provide visual cue, wrist band, yellow gown, etc. Monitor gait and stability. Monitor for mental status changes and reorient to person, place, and time. Review medications for side effects contributing to fall risk. Reinforce activity limits and safety measures with patient and family. Provide visual clues: red socks.

## 2020-05-18 NOTE — H&P ADULT - NSHPREVIEWOFSYSTEMS_GEN_ALL_CORE
CONSTITUTIONAL: denies fever, chills, fatigue, weakness  HEENT: denies blurred visions, sore throat  SKIN: denies new lesions, rash  CARDIOVASCULAR: denies chest pain, chest pressure, palpitations  RESPIRATORY: denies shortness of breath, sputum production  GASTROINTESTINAL: denies nausea, vomiting, diarrhea, abdominal pain  GENITOURINARY: denies dysuria, discharge  NEUROLOGICAL: denies numbness, headache, focal weakness  MUSCULOSKELETAL: denies new joint pain, muscle aches  HEMATOLOGIC: denies gross bleeding, bruising  LYMPHATICS: denies enlarged lymph nodes, extremity swelling  PSYCHIATRIC: denies recent changes in anxiety, depression  ENDOCRINOLOGIC: denies sweating, cold or heat intolerance CONSTITUTIONAL: denies fever, chills. +weakness +fatigue  HEENT: denies blurred visions, sore throat  SKIN: denies new lesions, rash  CARDIOVASCULAR: Admits minor left sided chest pain. Denies chest pressure, palpitations  RESPIRATORY: Admits shortness of breath, sputum production (white)  GASTROINTESTINAL: Admits nausea, minor abdominal pain, one episode diarrhea yesterday. Denies vomiting.  GENITOURINARY: denies dysuria, discharge  NEUROLOGICAL: denies numbness, headache, focal weakness  MUSCULOSKELETAL: denies new joint pain, muscle aches  HEMATOLOGIC: denies gross bleeding, bruising  LYMPHATICS: denies enlarged lymph nodes. +worsening chronic leg swelling  PSYCHIATRIC: denies recent changes in anxiety, depression  ENDOCRINOLOGIC: denies sweating, cold or heat intolerance

## 2020-05-18 NOTE — H&P ADULT - NSHPSOCIALHISTORY_GEN_ALL_CORE
Lives in same house as daughter  Patient on Home O2 (5-6L). Has the help of a home aid  Ambulates with walker at home.   Smoking: Former smoker, quit 17 years ago. 45 pack years  Alcohol: Denies  Other drug use: Denies

## 2020-05-18 NOTE — ED ADULT NURSE NOTE - CHIEF COMPLAINT QUOTE
transfer from [facility] for admission for SOB, hyponatremia, anemia, hypoxia  r/o COVID

## 2020-05-18 NOTE — ED ADULT NURSE NOTE - ED STAT RN HANDOFF DETAILS
Report endorsed to oncoming floor RN. Safety checks compld this shift/Safety rounds completed hourly.  IV sites checked Q2+remains WDL. Meds given as ord with no s/s of adverse RXNs. Fall +skin precs in place. Any issues endorsed to oncoming RN for follow up.

## 2020-05-18 NOTE — ED ADULT NURSE NOTE - OBJECTIVE STATEMENT
Presents to ED via amb from home. Pt c/o weakness, tenderness over left upper chest which she states she always has & SOB which she always has. O2 @ home 24/7. Denies any change in her breathing but states she is expectorating clear phlegm. Denies fever. Color fair. Skin warm & dry to touch. Lungs- clear- diminished @ bases. Bilat lower extremities swollen which is her norm. Sacrum- reddened- no breakdown.

## 2020-05-18 NOTE — ED ADULT NURSE NOTE - NSIMPLEMENTINTERV_GEN_ALL_ED
Implemented All Fall with Harm Risk Interventions:  [place] to call system. Call bell, personal items and telephone within reach. Instruct patient to call for assistance. Room bathroom lighting operational. Non-slip footwear when patient is off stretcher. Physically safe environment: no spills, clutter or unnecessary equipment. Stretcher in lowest position, wheels locked, appropriate side rails in place. Provide visual cue, wrist band, yellow gown, etc. Monitor gait and stability. Monitor for mental status changes and reorient to person, place, and time. Review medications for side effects contributing to fall risk. Reinforce activity limits and safety measures with patient and family. Provide visual clues: red socks.

## 2020-05-18 NOTE — CONSULT NOTE ADULT - ASSESSMENT
The patient is a 70 year old female with a history of HTN, DM, chronic diastolic heart failure, COPD, pulmonary hypertension who presents with shortness of breath, likely multifactorial from COPD, chronic diastolic heart failure.    Plan:  - ECG with no evidence of ischemia or infarction  - Given duration of symptoms, an acute MI is ruled out with one set of cardiac enzymes  - BNP elevated at 5421  - CXR with clear lungs  - COVID-19 pending  - Infectious work-up  - MAURICIO noted; baseline unknown (it was normal many years ago)  - Continue torsemide 40 mg tid  - Hold metolazone  - Hold spironolactone  - Continue sildenafil - clarify home dose  - Continue diltiazem 120 mg daily  - Check echocardiogram

## 2020-05-18 NOTE — ED PROVIDER NOTE - CLINICAL SUMMARY MEDICAL DECISION MAKING FREE TEXT BOX
BIBA from home due to weakness, SOB, cough x 4 days. Hypoxic on room air, requiring non-rebreather. Exam noted b/l pitting edema and b/l rales. plan includes labs, COVID testing, BNP/CXR r/o pneumonia vs CHF, EKG/troponin r/o CAD, admission

## 2020-05-18 NOTE — ED ADULT TRIAGE NOTE - CHIEF COMPLAINT QUOTE
transfer from [facility] for admission for SOB, hyponatremia, anemia, hypoxia  r/o COVID

## 2020-05-19 LAB
A1C WITH ESTIMATED AVERAGE GLUCOSE RESULT: 6.5 % — HIGH (ref 4–5.6)
ALBUMIN SERPL ELPH-MCNC: 3.3 G/DL — SIGNIFICANT CHANGE UP (ref 3.3–5)
ALP SERPL-CCNC: 124 U/L — HIGH (ref 40–120)
ALT FLD-CCNC: 14 U/L — SIGNIFICANT CHANGE UP (ref 12–78)
ANION GAP SERPL CALC-SCNC: 10 MMOL/L — SIGNIFICANT CHANGE UP (ref 5–17)
AST SERPL-CCNC: 12 U/L — LOW (ref 15–37)
BASE EXCESS BLDV CALC-SCNC: 10.7 MMOL/L — HIGH (ref -2–2)
BASOPHILS # BLD AUTO: 0.02 K/UL — SIGNIFICANT CHANGE UP (ref 0–0.2)
BASOPHILS NFR BLD AUTO: 0.3 % — SIGNIFICANT CHANGE UP (ref 0–2)
BILIRUB SERPL-MCNC: 0.8 MG/DL — SIGNIFICANT CHANGE UP (ref 0.2–1.2)
BLOOD GAS COMMENTS, VENOUS: SIGNIFICANT CHANGE UP
BUN SERPL-MCNC: 88 MG/DL — HIGH (ref 7–23)
CALCIUM SERPL-MCNC: 9.5 MG/DL — SIGNIFICANT CHANGE UP (ref 8.5–10.1)
CHLORIDE SERPL-SCNC: 83 MMOL/L — LOW (ref 96–108)
CO2 SERPL-SCNC: 37 MMOL/L — HIGH (ref 22–31)
CORTIS AM PEAK SERPL-MCNC: 14.3 UG/DL — SIGNIFICANT CHANGE UP (ref 6–18.4)
CREAT ?TM UR-MCNC: 84 MG/DL — SIGNIFICANT CHANGE UP
CREAT SERPL-MCNC: 2 MG/DL — HIGH (ref 0.5–1.3)
EOSINOPHIL # BLD AUTO: 0.05 K/UL — SIGNIFICANT CHANGE UP (ref 0–0.5)
EOSINOPHIL NFR BLD AUTO: 0.7 % — SIGNIFICANT CHANGE UP (ref 0–6)
ESTIMATED AVERAGE GLUCOSE: 140 MG/DL — HIGH (ref 68–114)
GLUCOSE SERPL-MCNC: 137 MG/DL — HIGH (ref 70–99)
HCO3 BLDV-SCNC: 34 MMOL/L — HIGH (ref 21–29)
HCT VFR BLD CALC: 26.4 % — LOW (ref 34.5–45)
HCV AB S/CO SERPL IA: 0.14 S/CO — SIGNIFICANT CHANGE UP (ref 0–0.99)
HCV AB SERPL-IMP: SIGNIFICANT CHANGE UP
HGB BLD-MCNC: 8.4 G/DL — LOW (ref 11.5–15.5)
HOROWITZ INDEX BLDV+IHG-RTO: 21 — SIGNIFICANT CHANGE UP
IMM GRANULOCYTES NFR BLD AUTO: 0.4 % — SIGNIFICANT CHANGE UP (ref 0–1.5)
LEGIONELLA AG UR QL: NEGATIVE — SIGNIFICANT CHANGE UP
LYMPHOCYTES # BLD AUTO: 0.75 K/UL — LOW (ref 1–3.3)
LYMPHOCYTES # BLD AUTO: 10.1 % — LOW (ref 13–44)
MAGNESIUM SERPL-MCNC: 2.3 MG/DL — SIGNIFICANT CHANGE UP (ref 1.6–2.6)
MCHC RBC-ENTMCNC: 25.7 PG — LOW (ref 27–34)
MCHC RBC-ENTMCNC: 31.8 GM/DL — LOW (ref 32–36)
MCV RBC AUTO: 80.7 FL — SIGNIFICANT CHANGE UP (ref 80–100)
MONOCYTES # BLD AUTO: 0.61 K/UL — SIGNIFICANT CHANGE UP (ref 0–0.9)
MONOCYTES NFR BLD AUTO: 8.2 % — SIGNIFICANT CHANGE UP (ref 2–14)
NEUTROPHILS # BLD AUTO: 6 K/UL — SIGNIFICANT CHANGE UP (ref 1.8–7.4)
NEUTROPHILS NFR BLD AUTO: 80.3 % — HIGH (ref 43–77)
NRBC # BLD: 0 /100 WBCS — SIGNIFICANT CHANGE UP (ref 0–0)
OSMOLALITY SERPL: 301 MOSMOL/KG — SIGNIFICANT CHANGE UP (ref 280–301)
OSMOLALITY UR: 352 MOSM/KG — SIGNIFICANT CHANGE UP (ref 50–1200)
PCO2 BLDV: 43 MMHG — SIGNIFICANT CHANGE UP (ref 35–50)
PH BLDV: 7.51 — HIGH (ref 7.35–7.45)
PHOSPHATE SERPL-MCNC: 4.4 MG/DL — SIGNIFICANT CHANGE UP (ref 2.5–4.5)
PLATELET # BLD AUTO: 231 K/UL — SIGNIFICANT CHANGE UP (ref 150–400)
PO2 BLDV: 148 MMHG — HIGH (ref 25–45)
POTASSIUM SERPL-MCNC: 3.6 MMOL/L — SIGNIFICANT CHANGE UP (ref 3.5–5.3)
POTASSIUM SERPL-SCNC: 3.6 MMOL/L — SIGNIFICANT CHANGE UP (ref 3.5–5.3)
POTASSIUM UR-SCNC: 29.2 MMOL/L — SIGNIFICANT CHANGE UP
PROT SERPL-MCNC: 7.1 G/DL — SIGNIFICANT CHANGE UP (ref 6–8.3)
RAPID RVP RESULT: SIGNIFICANT CHANGE UP
RBC # BLD: 3.27 M/UL — LOW (ref 3.8–5.2)
RBC # FLD: 15 % — HIGH (ref 10.3–14.5)
SAO2 % BLDV: 100 % — HIGH (ref 67–88)
SARS-COV-2 RNA SPEC QL NAA+PROBE: SIGNIFICANT CHANGE UP
SODIUM SERPL-SCNC: 130 MMOL/L — LOW (ref 135–145)
SODIUM UR-SCNC: 39 MMOL/L — SIGNIFICANT CHANGE UP
TSH SERPL-MCNC: 3.07 UIU/ML — SIGNIFICANT CHANGE UP (ref 0.36–3.74)
TSH SERPL-MCNC: 3.46 UIU/ML — SIGNIFICANT CHANGE UP (ref 0.36–3.74)
WBC # BLD: 7.46 K/UL — SIGNIFICANT CHANGE UP (ref 3.8–10.5)
WBC # FLD AUTO: 7.46 K/UL — SIGNIFICANT CHANGE UP (ref 3.8–10.5)

## 2020-05-19 PROCEDURE — 93970 EXTREMITY STUDY: CPT | Mod: 26

## 2020-05-19 PROCEDURE — 99233 SBSQ HOSP IP/OBS HIGH 50: CPT

## 2020-05-19 RX ORDER — ACETAMINOPHEN 500 MG
650 TABLET ORAL ONCE
Refills: 0 | Status: COMPLETED | OUTPATIENT
Start: 2020-05-19 | End: 2020-05-19

## 2020-05-19 RX ADMIN — Medication 3 MILLILITER(S): at 01:45

## 2020-05-19 RX ADMIN — Medication 1: at 17:57

## 2020-05-19 RX ADMIN — Medication 120 MILLIGRAM(S): at 05:50

## 2020-05-19 RX ADMIN — Medication 20 MILLIGRAM(S): at 17:46

## 2020-05-19 RX ADMIN — Medication 40 MILLIGRAM(S): at 17:46

## 2020-05-19 RX ADMIN — Medication 20 MILLIGRAM(S): at 21:36

## 2020-05-19 RX ADMIN — Medication 1 MILLIGRAM(S): at 11:35

## 2020-05-19 RX ADMIN — Medication 1 TABLET(S): at 11:35

## 2020-05-19 RX ADMIN — Medication 650 MILLIGRAM(S): at 21:59

## 2020-05-19 RX ADMIN — Medication 325 MILLIGRAM(S): at 11:35

## 2020-05-19 RX ADMIN — Medication 3 MILLILITER(S): at 20:15

## 2020-05-19 RX ADMIN — Medication 1: at 05:51

## 2020-05-19 RX ADMIN — Medication 1000 UNIT(S): at 11:35

## 2020-05-19 RX ADMIN — Medication 100 MILLIEQUIVALENT(S): at 04:24

## 2020-05-19 RX ADMIN — Medication 20 MILLIGRAM(S): at 05:50

## 2020-05-19 RX ADMIN — PANTOPRAZOLE SODIUM 40 MILLIGRAM(S): 20 TABLET, DELAYED RELEASE ORAL at 05:50

## 2020-05-19 RX ADMIN — Medication 3 MILLILITER(S): at 12:55

## 2020-05-19 RX ADMIN — SERTRALINE 100 MILLIGRAM(S): 25 TABLET, FILM COATED ORAL at 11:35

## 2020-05-19 RX ADMIN — Medication 1: at 12:20

## 2020-05-19 RX ADMIN — PANTOPRAZOLE SODIUM 40 MILLIGRAM(S): 20 TABLET, DELAYED RELEASE ORAL at 17:45

## 2020-05-19 RX ADMIN — Medication 100 MILLIEQUIVALENT(S): at 02:45

## 2020-05-19 NOTE — PROGRESS NOTE ADULT - PROBLEM SELECTOR PLAN 7
Chronic HFpEF, with ?pulm HTN found on CT  - Continue Torsemide 20 mg TID  - Continue Sildenafil  - Continue Cardizem  - Hold metolazone and spironolactone per Dr Staton  - Last documented TTE 5/2012 on our records; f/u echo  - Cardio (Dr Staton) consulted, recs appreciated

## 2020-05-19 NOTE — PROGRESS NOTE ADULT - SUBJECTIVE AND OBJECTIVE BOX
ADMISSION HPI:  70 year old Female with PMHx of COPD (on home O2 via NC 5-6 L), chronic HFpEF (last documented TTE 5/2012), HTN, DM2, anxiety who presented with c/o SOB  and fatigue for four days. Patient also describes having a cough productive of white sputum, worsening lower extremity edema, left chest discomfort, and nausea. Every day her symptoms get worse, and are especially bad when she is trying to get around the house. Patient is on 5-6L O2 at home typically saturating in the low 90s. Daughter gave her nasal saline for breathing, which was not effective. With worsening SOB and LE edema, daughters called her cardiologist who prescribed a second diuretic metazolone along with torsemide 20 mg TID. Two days ago, daughter called her cardiologist, [place] patient was over diuresed and stopped her diuretics for the past two days. She denies fevers/chills, headache, vomiting, sick contacts.         Patient was transferred from [facility] on 5/18 with the following vitals:   T: 97.4, HR 84, /67, RR: 18, SpO2 94% on non-rebreather.  CBC significant for Hgb 7.4 (in 2014 hgb 13.2), D-dimer <150. INR/PT/Ptt 1.26/14.1/27.3. CMP sig for Na 124-->125 s/p 1L NS at [facility], K 3.0 (3.0 in pl ED s/p 40KCl PO x1), Cl 81, CO2 37, BUN/Cr 96/1.93-->90/2.20 (in [facility] ED). Trops x 1 negative. Serum BNP 5421 5421. Lactate negative  CXR: The lungs are clear. The heart size is mildly enlarged. Multilevel degenerative changes are noted within the imaged potions of the spine.  EKG: NSR with RBBB per cardio note  Blood culture, CRP and Procalcitonin are in process  Meds given: tylenol 650, NS 1 liter bolus, 40KCl PO (18 May 2020 19:41)    INTERVAL HPI:  Patient seen and examined. No acute overnight events. She is lying in bed, appears comfortable on nasal canula. She reports that her breathing is somewhat better. She complains of intermittent chest pain, more on left side of chest wall and complains of occasional palpitations. She denies any N/V.     REVIEW OF SYSTEMS:  As per HPI.    VITAL SIGNS:  Vital Signs Last 24 Hrs  T(C): 36.3 (05-19-20 @ 14:15), Max: 37 (05-18-20 @ 16:30)  T(F): 97.3 (05-19-20 @ 14:15), Max: 98.6 (05-18-20 @ 16:30)  HR: 72 (05-19-20 @ 14:15) (71 - 93)  BP: 99/52 (05-19-20 @ 14:15) (87/54 - 114/58)  BP(mean): --  RR: 22 (05-19-20 @ 14:15) (11 - 24)  SpO2: 90% (05-19-20 @ 14:15) (90% - 100%)      PHYSICAL EXAM:   GENERAL: obese elderly female, no acute distress  HEENT: NC/AT, EOMI, neck supple, MMM  RESPIRATORY: LCTAB/L, no rhonchi, rales, or wheezing  CARDIOVASCULAR: RRR, no murmurs, gallops, rubs  ABDOMINAL: soft, non-tender, non-distended, positive bowel sounds   EXTREMITIES: no clubbing, cyanosis, +2 pitting edema bilateral LEs  NEUROLOGICAL: alert and oriented x 3, non-focal  SKIN: warm, dry, intact  MUSCULOSKELETAL: no gross joint deformity                          8.4    7.46  )-----------( 231      ( 19 May 2020 04:33 )             26.4     05-19    130<L>  |  83<L>  |  88<H>  ----------------------------<  137<H>  3.6   |  37<H>  |  2.00<H>    Ca    9.5      19 May 2020 04:33  Phos  4.4     05-19  Mg     2.3     05-19    TPro  7.1  /  Alb  3.3  /  TBili  0.8  /  DBili  x   /  AST  12<L>  /  ALT  14  /  AlkPhos  124<H>  05-19    PT/INR - ( 18 May 2020 16:45 )   PT: 14.1 sec;   INR: 1.26 ratio         PTT - ( 18 May 2020 16:45 )  PTT:27.3 sec    CAPILLARY BLOOD GLUCOSE  POCT Blood Glucose.: 192 mg/dL (19 May 2020 11:43)  POCT Blood Glucose.: 154 mg/dL (19 May 2020 05:46)  POCT Blood Glucose.: 202 mg/dL (18 May 2020 21:31)  POCT Blood Glucose.: 123 mg/dL (18 May 2020 16:00)          MEDICATIONS  (STANDING):  albuterol/ipratropium for Nebulization 3 milliLiter(s) Nebulizer every 6 hours  cholecalciferol 1000 Unit(s) Oral daily  dextrose 5%. 1000 milliLiter(s) (50 mL/Hr) IV Continuous <Continuous>  dextrose 5%. 1000 milliLiter(s) (50 mL/Hr) IV Continuous <Continuous>  dextrose 50% Injectable 12.5 Gram(s) IV Push once  dextrose 50% Injectable 25 Gram(s) IV Push once  dextrose 50% Injectable 25 Gram(s) IV Push once  dextrose 50% Injectable 12.5 Gram(s) IV Push once  dextrose 50% Injectable 25 Gram(s) IV Push once  dextrose 50% Injectable 25 Gram(s) IV Push once  diltiazem    milliGRAM(s) Oral daily  ferrous    sulfate 325 milliGRAM(s) Oral daily  folic acid 1 milliGRAM(s) Oral daily  insulin lispro (HumaLOG) corrective regimen sliding scale   SubCutaneous at bedtime  insulin lispro (HumaLOG) corrective regimen sliding scale   SubCutaneous three times a day before meals  methylPREDNISolone sodium succinate Injectable 40 milliGRAM(s) IV Push daily  multivitamin 1 Tablet(s) Oral daily  pantoprazole  Injectable 40 milliGRAM(s) IV Push every 12 hours  sertraline 100 milliGRAM(s) Oral daily  sildenafil (REVATIO) 20 milliGRAM(s) Oral three times a day  torsemide 20 milliGRAM(s) Oral <User Schedule>    MEDICATIONS  (PRN):  dextrose 40% Gel 15 Gram(s) Oral once PRN Blood Glucose LESS THAN 70 milliGRAM(s)/deciliter  dextrose 40% Gel 15 Gram(s) Oral once PRN Blood Glucose LESS THAN 70 milliGRAM(s)/deciliter  glucagon  Injectable 1 milliGRAM(s) IntraMuscular once PRN Glucose LESS THAN 70 milligrams/deciliter  glucagon  Injectable 1 milliGRAM(s) IntraMuscular once PRN Glucose LESS THAN 70 milligrams/deciliter

## 2020-05-19 NOTE — ED ADULT NURSE REASSESSMENT NOTE - NS ED NURSE REASSESS COMMENT FT1
Safety checks compld + maintd. T+P encouraged at this time. IV sites checked Q2+remains WDL. meds given as ord with no s/s of adverse RXNs. skin care and complete care rendered. Fall +skin precs in place. Call bell within reach and safety measures in place. Attempting report to floorX2 at this time and awaiting call back

## 2020-05-19 NOTE — PROGRESS NOTE ADULT - ASSESSMENT
70 year old Female with PMHx of COPD (on home O2 via NC 5-6 L), chronic HFpEF (last documented TTE 5/2012), HTN, DM2, Mood disorder who presented with c/o SOB x3 days, +non productive cough and chronic lower extremity edema, admitted for acute hypoxemic respiratory failure likely multifactorial, due to mild COPD exacerbation, possible acute diastolic heart failure, complicated by MAURICIO, hyponatremia.

## 2020-05-19 NOTE — CONSULT NOTE ADULT - PROBLEM SELECTOR RECOMMENDATION 9
copd - jairo - ckd - anemia - HFpEF - Pulm HTN - HTN  spoke with dtr this am   pt follows with Nacho Blanco MD in [facility]   on Trelegy Inhaler at home - for now on NEBS - due to Trelegy - not on formulary in the hospital and pt is unable to use Inhaler device at present  Steroids - NEBS - o2 support  CPAP night time  [facility] Rx regimen optimization - on diuretic regimen, revatio for Pulm HTN  CT chest and LE dopplers reviewed  discussed GOC and care plan with pt and dtr  will follow

## 2020-05-19 NOTE — ED ADULT NURSE REASSESSMENT NOTE - NS ED NURSE REASSESS COMMENT FT1
Core lab called to FU on COVID swab with no results at this time. Awaiting results for admission placement. Pt resting comfortably at this time. No s/s of pain noted. will continue to monitor

## 2020-05-19 NOTE — PROGRESS NOTE ADULT - PROBLEM SELECTOR PLAN 3
Seems to be improving. Unclear cause. 2 bowel movements today.  - CT abdomen/pelvis shows possible mild pancreatitis - Lipase 91 making pancreatitis unlikely.

## 2020-05-19 NOTE — PROGRESS NOTE ADULT - PROBLEM SELECTOR PLAN 10
10. DVT PPx: SCDs ordered  Chemical PPx held in setting of ?bleed    11. DM2:  - Hold oral hypoglycemic med: Metformin  - Insulin Corrective Scale with Accuchecks per routine, hypoglycemia protocol  - Consistent Carb Diet, Hemoglobin A1c is 6.5    12. GOC: Per daughter patient is DNR. Daughter to email [facility] and will have Palliative care team update. 10. DVT PPx: SCDs ordered  Chemical PPx held in setting of ?bleed    11. DM2:  - Hold oral hypoglycemic med: Metformin  - Insulin Corrective Scale with Accuchecks per routine, hypoglycemia protocol  - Consistent Carb Diet, Hemoglobin A1c is 6.5

## 2020-05-19 NOTE — ED ADULT NURSE REASSESSMENT NOTE - NS ED NURSE REASSESS COMMENT FT1
Safety checks compld + maintd. T+P with 2 staff assist encouraged at this time. Placed in hospital bed for comfort. IV sites checked Q2+remains WDL. meds given as ord with no s/s of adverse RXNs. skin care and complete care rendered, external incontinence device continued. Fall +skin precs in place. Call bell within reach and safety measures in place.

## 2020-05-19 NOTE — CONSULT NOTE ADULT - SUBJECTIVE AND OBJECTIVE BOX
Date/Time Patient Seen:  		  Referring MD:   Data Reviewed	       Patient is a 70y old  Female who presents with a chief complaint of acute hypoxemic respiratory failure (18 May 2020 19:41)      Subjective/HPI    in bed  seen and examined  vs and meds reviewed  labs reviewed  H and P reviewed  ER provider note reviewed  on o2 support - V mask -   poor historian     History and Physical:   Source of Information	Chart(s), Patient, Child  Outpatient Providers	PCP: Alejandro  Cardiologist: Dr. Mendelson in [facility]     Language:  · Patient/Family of Limited English Proficiency	No     Patient Identity:  · Birth Sex	Female       History of Present Illness:  Reason for Admission: acute hypoxemic respiratory failure  History of Present Illness:   70 year old Female with PMHx of COPD (on home O2 via NC 5-6 L), chronic HFpEF (last documented TTE 2012), HTN, DM2, anxiety who presented with c/o SOB  and fatigue for four days. Patient also describes having a cough productive of white sputum, worsening lower extremity edema, left chest discomfort, and nausea. Every day her symptoms get worse, and are especially bad when she is trying to get around the house. Patient is on 5-6L O2 at home typically saturating in the low 90s. Daughter gave her nasal saline for breathing, which was not effective. With worsening SOB and LE edema, daughters called her cardiologist who prescribed a second diuretic metazolone along with torsemide 20 mg TID. Two days ago, daughter called her cardiologist, [place] patient was over diuresed and stopped her diuretics for the past two days. She denies fevers/chills, headache, vomiting, sick contacts.         Patient was transferred from [place] ED on  with the following vitals:   T: 97.4, HR 84, /67, RR: 18, SpO2 94% on non-rebreather.  CBC significant for Hgb 7.4 (in  hgb 13.2), D-dimer <150. INR/PT/Ptt 1.26/14.1/27.3. CMP sig for Na 124-->125 s/p 1L NS at [place] ED, K 3.0 (3.0 in [facility] ED s/p 40KCl PO x1), Cl 81, CO2 37, BUN/Cr 96/1.93-->90/2.20 (in [facility] ED). Trops x 1 negative. Serum BNP 5421 5421. Lactate negative  CXR: The lungs are clear. The heart size is mildly enlarged. Multilevel degenerative changes are noted within the imaged potions of the spine.  EKG: NSR with RBBB per cardio note  COVID 19 PCR in process  Blood culture, CRP and Procalcitonin are in process  Meds given: tylenol 650, NS 1 liter bolus, 40KCl PO     PAST MEDICAL & SURGICAL HISTORY:  Hypertension  Type 2 diabetes mellitus  COPD with hypoxia  Pulmonary hypertension  History of Cholecystectomy  HTN (Hypertension)  Obstructive Sleep Apnea  COPD (Chronic Obstructive Pulmonary Disease)  Diabetes Mellitus Type II  S/P tubal ligation  S/P Cholecystectomy        Medication list         MEDICATIONS  (STANDING):  albuterol/ipratropium for Nebulization 3 milliLiter(s) Nebulizer every 6 hours  cholecalciferol 1000 Unit(s) Oral daily  dextrose 5%. 1000 milliLiter(s) (50 mL/Hr) IV Continuous <Continuous>  dextrose 5%. 1000 milliLiter(s) (50 mL/Hr) IV Continuous <Continuous>  dextrose 50% Injectable 12.5 Gram(s) IV Push once  dextrose 50% Injectable 25 Gram(s) IV Push once  dextrose 50% Injectable 25 Gram(s) IV Push once  dextrose 50% Injectable 12.5 Gram(s) IV Push once  dextrose 50% Injectable 25 Gram(s) IV Push once  dextrose 50% Injectable 25 Gram(s) IV Push once  diltiazem    milliGRAM(s) Oral daily  ferrous    sulfate 325 milliGRAM(s) Oral daily  folic acid 1 milliGRAM(s) Oral daily  insulin lispro (HumaLOG) corrective regimen sliding scale   SubCutaneous at bedtime  insulin lispro (HumaLOG) corrective regimen sliding scale   SubCutaneous three times a day before meals  multivitamin 1 Tablet(s) Oral daily  pantoprazole  Injectable 40 milliGRAM(s) IV Push every 12 hours  sertraline 100 milliGRAM(s) Oral daily  sildenafil (REVATIO) 20 milliGRAM(s) Oral three times a day  torsemide 20 milliGRAM(s) Oral <User Schedule>    MEDICATIONS  (PRN):  dextrose 40% Gel 15 Gram(s) Oral once PRN Blood Glucose LESS THAN 70 milliGRAM(s)/deciliter  dextrose 40% Gel 15 Gram(s) Oral once PRN Blood Glucose LESS THAN 70 milliGRAM(s)/deciliter  glucagon  Injectable 1 milliGRAM(s) IntraMuscular once PRN Glucose LESS THAN 70 milligrams/deciliter  glucagon  Injectable 1 milliGRAM(s) IntraMuscular once PRN Glucose LESS THAN 70 milligrams/deciliter         Vitals log        ICU Vital Signs Last 24 Hrs  T(C): 36.5 (19 May 2020 06:55), Max: 37 (18 May 2020 16:30)  T(F): 97.7 (19 May 2020 06:55), Max: 98.6 (18 May 2020 16:30)  HR: 71 (19 May 2020 06:55) (71 - 93)  BP: 109/72 (19 May 2020 06:55) (87/54 - 114/58)  BP(mean): --  ABP: --  ABP(mean): --  RR: 19 (19 May 2020 06:55) (11 - 24)  SpO2: 98% (19 May 2020 06:55) (94% - 100%)           Input and Output:  I&O's Detail    18 May 2020 07:01  -  19 May 2020 07:00  --------------------------------------------------------  IN:    IV PiggyBack: 300 mL    Packed Red Blood Cells: 300 mL  Total IN: 600 mL    OUT:    Incontinent per Collection Ba mL  Total OUT: 1125 mL    Total NET: -525 mL          Lab Data                        8.4    7.46  )-----------( 231      ( 19 May 2020 04:33 )             26.4     05-19    130<L>  |  83<L>  |  88<H>  ----------------------------<  137<H>  3.6   |  37<H>  |  2.00<H>    Ca    9.5      19 May 2020 04:33  Phos  4.4     05-19  Mg     2.3     19    TPro  7.1  /  Alb  3.3  /  TBili  0.8  /  DBili  x   /  AST  12<L>  /  ALT  14  /  AlkPhos  124<H>  05-19      CARDIAC MARKERS ( 18 May 2020 16:45 )  .000 ng/mL / x     / x     / x     / x            Review of Systems	  sob  [place]  hypoxemic      Objective     Physical Examination    heart s1s2  lung dec BS  abd soft  head at  head nc      Pertinent Lab findings & Imaging      Cervantes:  NO   Adequate UO     I&O's Detail    18 May 2020 07:01  -  19 May 2020 07:00  --------------------------------------------------------  IN:    IV PiggyBack: 300 mL    Packed Red Blood Cells: 300 mL  Total IN: 600 mL    OUT:    Incontinent per Collection Ba mL  Total OUT: 1125 mL    Total NET: -525 mL               Discussed with:     Cultures:	        Radiology      EXAM:  US DPLX LWR EXT VEINS COMPL BI                            PROCEDURE DATE:  2020          INTERPRETATION:  CLINICAL INFORMATION: Bilateral lower extremity edema    COMPARISON: None available.    TECHNIQUE: Duplex sonography of the BILATERAL LOWER extremity veins with color and spectral Doppler, with and without compression.      FINDINGS:    There is normal compressibility of the bilateral common femoral, femoral and popliteal veins.     Doppler examination shows normal spontaneous and phasic flow.    No calf vein thrombosis is detected.    IMPRESSION:     No evidence of deep venous thrombosis in either lower extremity.                      AMILCAR GONSALES M.D., ATTENDING RADIOLOGIST  This document has been electronically signed. May 19 2020  1:12AM            EXAM:  CT ABDOMEN AND PELVIS                          EXAM:  CT CHEST                            PROCEDURE DATE:  2020          INTERPRETATION:  CLINICAL INFORMATION: Hypoxemia. Abdominal distention.    COMPARISON: CT chest from 2012.    PROCEDURE:   CT of the Chest, Abdomen and Pelvis was performed without intravenous contrast.   Intravenous contrast: None.  Oral contrast: None.  Sagittal and coronal reformats were performed.    FINDINGS:    CHEST:     LUNGS AND LARGE AIRWAYS: Patent central airways. No pulmonary nodules.  PLEURA: No pleural effusion.  VESSELS: Enlarged main pulmonary artery, measuring 3.9 cm. Atherosclerotic changes of the aorta.  HEART: Cardiomegaly. No pericardial effusion.  MEDIASTINUM AND RAIN: Subcentimeter short axis mediastinal lymph nodes.  CHEST WALL AND LOWER NECK: Within normal limits.    ABDOMEN AND PELVIS:    LIVER: Within normal limits.  BILE DUCTS: Normal caliber.  GALLBLADDER: Cholecystectomy.  SPLEEN: Within normal limits.  PANCREAS: Mild peripancreatic fat stranding.  ADRENALS: Within normal limits.  KIDNEYS/URETERS: Within normal limits.    BLADDER: Within normal limits.  REPRODUCTIVE ORGANS: Posterior uterine leiomyoma.    BOWEL: No bowel obstruction.   PERITONEUM: No ascites.  VESSELS: Atherosclerotic changes.  RETROPERITONEUM/LYMPH NODES: No lymphadenopathy.    ABDOMINAL WALL: Small fat-containing umbilical hernia. Small amount of air inferior to the hernia sac. Small bilateral fat-containing inguinal hernias.  BONES: Degenerative changes.    IMPRESSION:     Enlarged main pulmonary artery, possibly pulmonary arterial hypertension.    Possible mild pancreatitis.                AAMIR HENLEY M.D., ATTENDING RADIOLOGIST  This document has been electronically signed. May 18 2020  9:00PM

## 2020-05-19 NOTE — PROGRESS NOTE ADULT - SUBJECTIVE AND OBJECTIVE BOX
Chief Complaint: Shortness of breath    Interval Events: No events overnight. Sleeping.    Review of Systems:  General: No fevers, chills, weight loss or gain  Skin: No rashes, color changes  Cardiovascular: No chest pain, orthopnea  Respiratory: No shortness of breath, cough  Gastrointestinal: No nausea, abdominal pain  Genitourinary: No incontinence, pain with urination  Musculoskeletal: No pain, swelling, decreased range of motion  Neurological: No headache, weakness  Psychiatric: No depression, anxiety  Endocrine: No weight loss or gain, increased thirst  All other systems are comprehensively negative.    Physical Exam:  Vitals:        Vital Signs Last 24 Hrs  T(C): 36.5 (19 May 2020 06:55), Max: 37 (18 May 2020 16:30)  T(F): 97.7 (19 May 2020 06:55), Max: 98.6 (18 May 2020 16:30)  HR: 71 (19 May 2020 06:55) (71 - 93)  BP: 109/72 (19 May 2020 06:55) (87/54 - 114/58)  BP(mean): --  RR: 19 (19 May 2020 06:55) (11 - 24)  SpO2: 98% (19 May 2020 06:55) (94% - 100%)  General: NAD  HEENT: MMM  Neck: No JVD, no carotid bruit  Lungs: CTAB  CV: RRR, nl S1/S2, no M/R/G  Abdomen: S/NT/ND, +BS  Extremities: No LE edema, no cyanosis  Neuro: AAOx3, non-focal  Skin: No rash    Labs:                        8.4    7.46  )-----------( 231      ( 19 May 2020 04:33 )             26.4     05-19    130<L>  |  83<L>  |  88<H>  ----------------------------<  137<H>  3.6   |  37<H>  |  2.00<H>    Ca    9.5      19 May 2020 04:33  Phos  4.4     05-19  Mg     2.3     05-19    TPro  7.1  /  Alb  3.3  /  TBili  0.8  /  DBili  x   /  AST  12<L>  /  ALT  14  /  AlkPhos  124<H>  05-19    CARDIAC MARKERS ( 18 May 2020 16:45 )  .000 ng/mL / x     / x     / x     / x          PT/INR - ( 18 May 2020 16:45 )   PT: 14.1 sec;   INR: 1.26 ratio         PTT - ( 18 May 2020 16:45 )  PTT:27.3 sec    Telemetry: Sinus rhythm

## 2020-05-19 NOTE — CONSULT NOTE ADULT - ASSESSMENT
70 obese white female with a history of HTN, CAD, CHF,, DM. PVD, COPD now admitted with Cough, SOB and fatigue with a worsening lower extremity edema. She has MAURICIO oN CKD.   Most likely due to diuretics. Agree to hold the metolazone and aldactone. Will continue the torsemide and place on fluid restriction.   Will also hold the metformin for now,. Medications reviewed along with labs. Will follow closely.

## 2020-05-19 NOTE — PROGRESS NOTE ADULT - PROBLEM SELECTOR PLAN 5
?Mild pancreatitis on CT abdomen/pelvis with abdominal discomfort on exam  - lipase 91 making pancreatitis unlikely.

## 2020-05-19 NOTE — PROGRESS NOTE ADULT - PROBLEM SELECTOR PLAN 8
Chronic, on home O2 via NC 5-6 L  - On Trelegy at home however not on formulary and pt is not wheezing on exam  - Start Duonebs here for now  - Continue Solumedrol.  - Pulm following.

## 2020-05-19 NOTE — PROGRESS NOTE ADULT - PROBLEM SELECTOR PLAN 2
Anemia likely 2/2 GIB - Iron Deficiency vs ?hemorrhoidal, may be contributing to hypoxemia. Per daughter Hgb was 12 back in 8/2019.   - As per daughter, aid reports blood in patients stool.  - Continue home iron and folic acid  - Patient's physician reports she has chronic rectal bleeding  - s/p 1 U PRBCs yesterday.  - Guiac ordered

## 2020-05-19 NOTE — PROGRESS NOTE ADULT - PROBLEM SELECTOR PLAN 4
Hyponatremia improving Continue fluid restriction.  Hypokalemia resolved. Continue to monitor.  -Monitor chemistries  - Nephro (Dr Goldsmith) consulted

## 2020-05-19 NOTE — CONSULT NOTE ADULT - SUBJECTIVE AND OBJECTIVE BOX
Nephrology Consultation: MD ALTAF PetersGAVINO  70y    HPI:  70 year old Female with PMHx of COPD (on home O2 via NC 5-6 L), chronic HFpEF (last documented TTE 5/2012), HTN, DM2, anxiety who presented with c/o SOB  and fatigue for four days. Patient also describes having a cough productive of white sputum, worsening lower extremity edema, left chest discomfort, and nausea. Every day her symptoms get worse, and are especially bad when she is trying to get around the house. Patient is on 5-6L O2 at home typically saturating in the low 90s. Daughter gave her nasal saline for breathing, which was not effective. With worsening SOB and LE edema, daughters called her cardiologist who prescribed a second diuretic metazolone along with torsemide 20 mg TID. Two days ago, daughter called her cardiologist, [place] patient was over diuresed and stopped her diuretics for the past two days. She denies fevers/chills, headache, vomiting, sick contacts. Denies any use of NSAIDS. She is negative vor COVID PNA.   She was given a liter of IVF bolus in the ER. Cardiology note appreciated and the diuretics are on hold now.   She has been on Aldactone, Metolazone and Torsemide at home.     PAST MEDICAL & SURGICAL HISTORY:  Hypertension  Type 2 diabetes mellitus  COPD with hypoxia  Pulmonary hypertension  History of Cholecystectomy  HTN (Hypertension)  Obstructive Sleep Apnea  COPD (Chronic Obstructive Pulmonary Disease)  Diabetes Mellitus Type II  S/P tubal ligation  S/P Cholecystectomy    Allergies    No Known Allergies    Intolerances    Home Medications:  Cardizem 120 mg oral tablet: 1 tab(s) orally once a day (18 May 2020 21:08)  esomeprazole 40 mg oral delayed release capsule: 1 cap(s) orally once a day (18 May 2020 21:08)  folic acid: 1 tab(s) orally once a day (18 May 2020 21:08)  iron: 325 milligram(s) orally once a day (18 May 2020 21:08)  metFORMIN 500 mg oral tablet, extended release: 1 tab(s) orally once a day (18 May 2020 21:08)  metOLazone 2.5 mg oral tablet: 1 tab(s) orally once a day (18 May 2020 21:08)  multivitamin: 1 tab(s) orally once a day (18 May 2020 21:08)  sildenafil: 20 milligram(s) orally 3 times a day (18 May 2020 21:08)  spironolactone 25 mg oral tablet: 1 tab(s) orally once a day (18 May 2020 21:08)  torsemide: 20 milligram(s) orally 3 times a day (18 May 2020 21:08)  Trelegy Ellipta inhalation powder: 1 puff(s) inhaled once a day (18 May 2020 21:08)  Vitamin D: 1000 milligram(s) orally once a day (18 May 2020 21:08)  Zoloft 100 mg oral tablet: 1 tab(s) orally once a day (18 May 2020 21:08)  ZyrTEC: 1 tab(s) orally once a day (18 May 2020 21:08)        FAMILY HISTORY:      SOCIAL HISTORY:    REVIEW OF SYSTEMS:    Constitutional: No fever, weight loss or fatigue  Eyes: No eye pain, visual disturbances, or discharge  ENT:  No difficulty hearing, tinnitus, vertigo; No sinus or throat pain  Neck: No pain or stiffness  Breasts: No pain, masses or nipple discharge  Respiratory: No cough, wheezing, chills or hemoptysis  Cardiovascular: No chest pain, palpitations, shortness of breath, dizziness or leg swelling  Gastrointestinal: No abdominal or epigastric pain. No nausea, vomiting or hematemesis; No diarrhea or constipation. No melena or hematochezia.  Genitourinary: No dysuria, frequency, hematuria or incontinence  Rectal: No pain, hemorrhoids or incontinence  Neurological: No headaches, memory loss, loss of strength, numbness or tremors  Skin: No itching, burning, rashes or lesions   Lymph Nodes: No enlarged glands  Endocrine: No heat or cold intolerance; No hair loss  Musculoskeletal: No joint pain or swelling; No muscle, back or extremity pain  Psychiatric: No depression, anxiety, mood swings or difficulty sleeping  Heme/Lymph: No easy bruising or bleeding gums  Allergy and Immunologic: No hives or eczema    current medications:    albuterol/ipratropium for Nebulization 3 milliLiter(s) Nebulizer every 6 hours  cholecalciferol 1000 Unit(s) Oral daily  dextrose 40% Gel 15 Gram(s) Oral once PRN  dextrose 40% Gel 15 Gram(s) Oral once PRN  dextrose 5%. 1000 milliLiter(s) IV Continuous <Continuous>  dextrose 5%. 1000 milliLiter(s) IV Continuous <Continuous>  dextrose 50% Injectable 12.5 Gram(s) IV Push once  dextrose 50% Injectable 25 Gram(s) IV Push once  dextrose 50% Injectable 25 Gram(s) IV Push once  dextrose 50% Injectable 12.5 Gram(s) IV Push once  dextrose 50% Injectable 25 Gram(s) IV Push once  dextrose 50% Injectable 25 Gram(s) IV Push once  diltiazem    milliGRAM(s) Oral daily  ferrous    sulfate 325 milliGRAM(s) Oral daily  folic acid 1 milliGRAM(s) Oral daily  glucagon  Injectable 1 milliGRAM(s) IntraMuscular once PRN  glucagon  Injectable 1 milliGRAM(s) IntraMuscular once PRN  insulin lispro (HumaLOG) corrective regimen sliding scale   SubCutaneous at bedtime  insulin lispro (HumaLOG) corrective regimen sliding scale   SubCutaneous three times a day before meals  methylPREDNISolone sodium succinate Injectable 40 milliGRAM(s) IV Push daily  multivitamin 1 Tablet(s) Oral daily  pantoprazole  Injectable 40 milliGRAM(s) IV Push every 12 hours  sertraline 100 milliGRAM(s) Oral daily  sildenafil (REVATIO) 20 milliGRAM(s) Oral three times a day  torsemide 20 milliGRAM(s) Oral <User Schedule>      Vital Signs Last 24 Hrs  T(C): 36.5 (19 May 2020 06:55), Max: 37 (18 May 2020 16:30)  T(F): 97.7 (19 May 2020 06:55), Max: 98.6 (18 May 2020 16:30)  HR: 71 (19 May 2020 06:55) (71 - 93)  BP: 109/72 (19 May 2020 06:55) (87/54 - 114/58)  BP(mean): --  RR: 19 (19 May 2020 06:55) (11 - 24)  SpO2: 98% (19 May 2020 06:55) (94% - 100%)    PHYSICAL EXAM:    Constitutional: NAD, well-groomed, well-developed  HEENT: PERRLA, EOMI, Normal Hearing, MMM  Neck: No LAD, No JVD  Back: Normal spine flexure, No CVA tenderness  Respiratory: CTAB/L   Cardiovascular: S1 and S2, RRR, no M/G/R  Gastrointestinal: BS+, soft, NT/ND  Extremities: one plus peripheral edema  Vascular: 2+ peripheral pulses  Neurological: A/O x 3, no focal deficits  Skin: No rashes      LABS:                        8.4    7.46  )-----------( 231      ( 19 May 2020 04:33 )             26.4     05-19    130<L>  |  83<L>  |  88<H>  ----------------------------<  137<H>  3.6   |  37<H>  |  2.00<H>    Ca    9.5      19 May 2020 04:33  Phos  4.4     05-19  Mg     2.3     05-19    TPro  7.1  /  Alb  3.3  /  TBili  0.8  /  DBili  x   /  AST  12<L>  /  ALT  14  /  AlkPhos  124<H>  05-19    PT/INR - ( 18 May 2020 16:45 )   PT: 14.1 sec;   INR: 1.26 ratio         PTT - ( 18 May 2020 16:45 )  PTT:27.3 sec    Creatinine Trend: 2.00<--, 2.20<--, 1.93<--    MICROBIOLOGY:  RECENT CULTURES:        RADIOLOGY & ADDITIONAL STUDIES:    < from: CT Abdomen and Pelvis No Cont (05.18.20 @ 20:54) >  EXAM:  CT ABDOMEN AND PELVIS                          EXAM:  CT CHEST                            PROCEDURE DATE:  05/18/2020          INTERPRETATION:  CLINICAL INFORMATION: Hypoxemia. Abdominal distention.    COMPARISON: CT chest from April 30, 2012.    PROCEDURE:   CT of the Chest, Abdomen and Pelvis was performed without intravenous contrast.   Intravenous contrast: None.  Oral contrast: None.  Sagittal and coronal reformats were performed.    FINDINGS:    CHEST:     LUNGS AND LARGE AIRWAYS: Patent central airways. No pulmonary nodules.  PLEURA: No pleural effusion.  VESSELS: Enlarged main pulmonary artery, measuring 3.9 cm. Atherosclerotic changes of the aorta.  HEART: Cardiomegaly. No pericardial effusion.  MEDIASTINUM AND RAIN: Subcentimeter short axis mediastinal lymph nodes.  CHEST WALL AND LOWER NECK: Within normal limits.    ABDOMEN AND PELVIS:    LIVER: Within normal limits.  BILE DUCTS: Normal caliber.  GALLBLADDER: Cholecystectomy.  SPLEEN: Within normal limits.  PANCREAS: Mild peripancreatic fat stranding.  ADRENALS: Within normal limits.  KIDNEYS/URETERS: Within normal limits.    BLADDER: Within normal limits.  REPRODUCTIVE ORGANS: Posterior uterine leiomyoma.    BOWEL: No bowel obstruction.   PERITONEUM: No ascites.  VESSELS: Atherosclerotic changes.  RETROPERITONEUM/LYMPH NODES: No lymphadenopathy.    ABDOMINAL WALL: Small fat-containing umbilical hernia. Small amount of air inferior to the hernia sac. Small bilateral fat-containing inguinal hernias.  BONES: Degenerative changes.    IMPRESSION:     Enlarged main pulmonary artery, possibly pulmonary arterial hypertension.    Possible mild pancreatitis.

## 2020-05-19 NOTE — PROGRESS NOTE ADULT - ASSESSMENT
The patient is a 70 year old female with a history of HTN, DM, chronic diastolic heart failure, COPD, pulmonary hypertension who presents with shortness of breath, likely multifactorial from COPD, chronic diastolic heart failure.    Plan:  - BNP elevated at 5421  - CXR with clear lungs  - COVID-19 negative  - MAURICIO - slightly improved today  - Continue torsemide 20 mg tid  - Hold metolazone  - Hold spironolactone  - Continue sildenafil 20 mg tid  - Continue diltiazem 120 mg daily  - Check echocardiogram  - Pulm eval

## 2020-05-20 LAB
ANION GAP SERPL CALC-SCNC: 9 MMOL/L — SIGNIFICANT CHANGE UP (ref 5–17)
BUN SERPL-MCNC: 81 MG/DL — HIGH (ref 7–23)
CALCIUM SERPL-MCNC: 10.1 MG/DL — SIGNIFICANT CHANGE UP (ref 8.5–10.1)
CHLORIDE SERPL-SCNC: 83 MMOL/L — LOW (ref 96–108)
CO2 SERPL-SCNC: 34 MMOL/L — HIGH (ref 22–31)
CREAT SERPL-MCNC: 1.8 MG/DL — HIGH (ref 0.5–1.3)
GLUCOSE SERPL-MCNC: 162 MG/DL — HIGH (ref 70–99)
HCT VFR BLD CALC: 26.5 % — LOW (ref 34.5–45)
HGB BLD-MCNC: 8.5 G/DL — LOW (ref 11.5–15.5)
MAGNESIUM SERPL-MCNC: 2.4 MG/DL — SIGNIFICANT CHANGE UP (ref 1.6–2.6)
MCHC RBC-ENTMCNC: 25.7 PG — LOW (ref 27–34)
MCHC RBC-ENTMCNC: 32.1 GM/DL — SIGNIFICANT CHANGE UP (ref 32–36)
MCV RBC AUTO: 80.1 FL — SIGNIFICANT CHANGE UP (ref 80–100)
NRBC # BLD: 0 /100 WBCS — SIGNIFICANT CHANGE UP (ref 0–0)
PHOSPHATE SERPL-MCNC: 4.6 MG/DL — HIGH (ref 2.5–4.5)
PLATELET # BLD AUTO: 214 K/UL — SIGNIFICANT CHANGE UP (ref 150–400)
POTASSIUM SERPL-MCNC: 3.4 MMOL/L — LOW (ref 3.5–5.3)
POTASSIUM SERPL-SCNC: 3.4 MMOL/L — LOW (ref 3.5–5.3)
RBC # BLD: 3.31 M/UL — LOW (ref 3.8–5.2)
RBC # FLD: 15 % — HIGH (ref 10.3–14.5)
SODIUM SERPL-SCNC: 126 MMOL/L — LOW (ref 135–145)
WBC # BLD: 4.28 K/UL — SIGNIFICANT CHANGE UP (ref 3.8–10.5)
WBC # FLD AUTO: 4.28 K/UL — SIGNIFICANT CHANGE UP (ref 3.8–10.5)

## 2020-05-20 PROCEDURE — 99233 SBSQ HOSP IP/OBS HIGH 50: CPT

## 2020-05-20 RX ORDER — SODIUM CHLORIDE 9 MG/ML
2 INJECTION INTRAMUSCULAR; INTRAVENOUS; SUBCUTANEOUS EVERY 6 HOURS
Refills: 0 | Status: COMPLETED | OUTPATIENT
Start: 2020-05-20 | End: 2020-05-20

## 2020-05-20 RX ORDER — POTASSIUM CHLORIDE 20 MEQ
40 PACKET (EA) ORAL ONCE
Refills: 0 | Status: COMPLETED | OUTPATIENT
Start: 2020-05-20 | End: 2020-05-20

## 2020-05-20 RX ORDER — ACETAMINOPHEN 500 MG
650 TABLET ORAL EVERY 6 HOURS
Refills: 0 | Status: DISCONTINUED | OUTPATIENT
Start: 2020-05-20 | End: 2020-05-25

## 2020-05-20 RX ADMIN — Medication 40 MILLIEQUIVALENT(S): at 13:18

## 2020-05-20 RX ADMIN — SODIUM CHLORIDE 2 GRAM(S): 9 INJECTION INTRAMUSCULAR; INTRAVENOUS; SUBCUTANEOUS at 17:06

## 2020-05-20 RX ADMIN — Medication 3 MILLILITER(S): at 08:00

## 2020-05-20 RX ADMIN — SODIUM CHLORIDE 2 GRAM(S): 9 INJECTION INTRAMUSCULAR; INTRAVENOUS; SUBCUTANEOUS at 12:18

## 2020-05-20 RX ADMIN — PANTOPRAZOLE SODIUM 40 MILLIGRAM(S): 20 TABLET, DELAYED RELEASE ORAL at 06:25

## 2020-05-20 RX ADMIN — Medication 20 MILLIGRAM(S): at 13:18

## 2020-05-20 RX ADMIN — Medication 20 MILLIGRAM(S): at 06:25

## 2020-05-20 RX ADMIN — Medication 1 TABLET(S): at 12:18

## 2020-05-20 RX ADMIN — PANTOPRAZOLE SODIUM 40 MILLIGRAM(S): 20 TABLET, DELAYED RELEASE ORAL at 17:06

## 2020-05-20 RX ADMIN — Medication 3 MILLILITER(S): at 20:30

## 2020-05-20 RX ADMIN — Medication 325 MILLIGRAM(S): at 12:17

## 2020-05-20 RX ADMIN — Medication 20 MILLIGRAM(S): at 22:11

## 2020-05-20 RX ADMIN — Medication 2: at 17:05

## 2020-05-20 RX ADMIN — Medication 2: at 12:16

## 2020-05-20 RX ADMIN — Medication 1 MILLIGRAM(S): at 12:17

## 2020-05-20 RX ADMIN — Medication 1000 UNIT(S): at 12:17

## 2020-05-20 RX ADMIN — Medication 650 MILLIGRAM(S): at 14:03

## 2020-05-20 RX ADMIN — SERTRALINE 100 MILLIGRAM(S): 25 TABLET, FILM COATED ORAL at 12:18

## 2020-05-20 RX ADMIN — Medication 40 MILLIGRAM(S): at 06:25

## 2020-05-20 RX ADMIN — Medication 1: at 07:58

## 2020-05-20 NOTE — PROGRESS NOTE ADULT - PROBLEM SELECTOR PLAN 10
10. DVT PPx: SCDs ordered  Chemical PPx held in setting of ?bleed    11: Advanced Directives: Patient is DNR/DNI.    12: Dispo: PT eval today for discharge planning    11. DM2:  - Hold oral hypoglycemic med: Metformin  - Insulin Corrective Scale with Accuchecks per routine, hypoglycemia protocol  - Consistent Carb Diet, Hemoglobin A1c is 6.5

## 2020-05-20 NOTE — PROGRESS NOTE ADULT - PROBLEM SELECTOR PLAN 2
Anemia likely 2/2 GIB - Iron Deficiency vs ?hemorrhoidal, may be contributing to hypoxemia. Per daughter Hgb was 12 back in 8/2019.   - As per daughter, aid reports blood in patients stool.  - Continue home iron and folic acid  - Patient's physician reports she has chronic rectal bleeding  - s/p 1 U PRBCs yesterday.  - Guiac ordered, still not sent.

## 2020-05-20 NOTE — PROGRESS NOTE ADULT - SUBJECTIVE AND OBJECTIVE BOX
ADMISSION HPI:  70 year old Female with PMHx of COPD (on home O2 via NC 5-6 L), chronic HFpEF (last documented TTE 5/2012), HTN, DM2, anxiety who presented with c/o SOB  and fatigue for four days. Patient also describes having a cough productive of white sputum, worsening lower extremity edema, left chest discomfort, and nausea. Every day her symptoms get worse, and are especially bad when she is trying to get around the house. Patient is on 5-6L O2 at home typically saturating in the low 90s. Daughter gave her nasal saline for breathing, which was not effective. With worsening SOB and LE edema, daughters called her cardiologist who prescribed a second diuretic metazolone along with torsemide 20 mg TID. Two days ago, daughter called her cardiologist, [place] patient was over diuresed and stopped her diuretics for the past two days. She denies fevers/chills, headache, vomiting, sick contacts.         Patient was transferred from [place] ED on 5/18 with the following vitals:   T: 97.4, HR 84, /67, RR: 18, SpO2 94% on non-rebreather.  CBC significant for Hgb 7.4 (in 2014 hgb 13.2), D-dimer <150. INR/PT/Ptt 1.26/14.1/27.3. CMP sig for Na 124-->125 s/p 1L NS at [place] ED, K 3.0 (3.0 in pl ED s/p 40KCl PO x1), Cl 81, CO2 37, BUN/Cr 96/1.93-->90/2.20 (in [facility] ED). Trops x 1 negative. Serum BNP 5421 5421. Lactate negative  CXR: The lungs are clear. The heart size is mildly enlarged. Multilevel degenerative changes are noted within the imaged potions of the spine.  EKG: NSR with RBBB per cardio note  Blood culture, CRP and Procalcitonin are in process  Meds given: tylenol 650, NS 1 liter bolus, 40KCl PO (18 May 2020 19:41)    INTERVAL HPI:  Patient seen and examined. No acute overnight events. She is lying in bed, appears comfortable on nasal canula. She reports that her breathing is somewhat better. No chest pain today. Say she feels much better overall. No N/V.    REVIEW OF SYSTEMS:  As per HPI.    VITAL SIGNS:  Vital Signs Last 24 Hrs  T(C): 36.8 (20 May 2020 04:30), Max: 36.8 (20 May 2020 04:30)  T(F): 98.2 (20 May 2020 04:30), Max: 98.2 (20 May 2020 04:30)  HR: 85 (20 May 2020 05:06) (72 - 91)  BP: 103/71 (20 May 2020 04:30) (99/52 - 108/65)  BP(mean): --  RR: 19 (20 May 2020 04:30) (19 - 22)  SpO2: 99% (20 May 2020 04:30) (90% - 99%)    PHYSICAL EXAM:   GENERAL: obese elderly female, no acute distress  HEENT: NC/AT, EOMI, neck supple, MMM  RESPIRATORY: LCTAB/L, no rhonchi, rales, or wheezing  CARDIOVASCULAR: RRR, no murmurs, gallops, rubs  ABDOMINAL: soft, non-tender, non-distended, positive bowel sounds   EXTREMITIES: no clubbing, cyanosis, +1-2 pitting edema bilateral LEs  NEUROLOGICAL: alert and oriented x 3, non-focal  SKIN: warm, dry, intact  MUSCULOSKELETAL: no gross joint deformity                           8.5    4.28  )-----------( 214      ( 20 May 2020 09:31 )             26.5   05-20    126<L>  |  83<L>  |  81<H>  ----------------------------<  162<H>  3.4<L>   |  34<H>  |  1.80<H>    Ca    10.1      20 May 2020 09:31  Phos  4.6     05-20  Mg     2.4     05-20    CAPILLARY BLOOD GLUCOSE  POCT Blood Glucose.: 198 mg/dL (20 May 2020 07:34)  POCT Blood Glucose.: 188 mg/dL (19 May 2020 21:44)  POCT Blood Glucose.: 199 mg/dL (19 May 2020 17:53)  POCT Blood Glucose.: 167 mg/dL (19 May 2020 16:35)  POCT Blood Glucose.: 192 mg/dL (19 May 2020 11:43)        MEDICATIONS  (STANDING):  albuterol/ipratropium for Nebulization 3 milliLiter(s) Nebulizer every 6 hours  cholecalciferol 1000 Unit(s) Oral daily  dextrose 5%. 1000 milliLiter(s) (50 mL/Hr) IV Continuous <Continuous>  dextrose 5%. 1000 milliLiter(s) (50 mL/Hr) IV Continuous <Continuous>  dextrose 50% Injectable 12.5 Gram(s) IV Push once  dextrose 50% Injectable 25 Gram(s) IV Push once  dextrose 50% Injectable 25 Gram(s) IV Push once  dextrose 50% Injectable 12.5 Gram(s) IV Push once  dextrose 50% Injectable 25 Gram(s) IV Push once  dextrose 50% Injectable 25 Gram(s) IV Push once  diltiazem    milliGRAM(s) Oral daily  ferrous    sulfate 325 milliGRAM(s) Oral daily  folic acid 1 milliGRAM(s) Oral daily  insulin lispro (HumaLOG) corrective regimen sliding scale   SubCutaneous at bedtime  insulin lispro (HumaLOG) corrective regimen sliding scale   SubCutaneous three times a day before meals  methylPREDNISolone sodium succinate Injectable 40 milliGRAM(s) IV Push daily  multivitamin 1 Tablet(s) Oral daily  pantoprazole  Injectable 40 milliGRAM(s) IV Push every 12 hours  potassium chloride    Tablet ER 40 milliEquivalent(s) Oral once  sertraline 100 milliGRAM(s) Oral daily  sildenafil (REVATIO) 20 milliGRAM(s) Oral three times a day  sodium chloride 2 Gram(s) Oral every 6 hours    MEDICATIONS  (PRN):  dextrose 40% Gel 15 Gram(s) Oral once PRN Blood Glucose LESS THAN 70 milliGRAM(s)/deciliter  dextrose 40% Gel 15 Gram(s) Oral once PRN Blood Glucose LESS THAN 70 milliGRAM(s)/deciliter  glucagon  Injectable 1 milliGRAM(s) IntraMuscular once PRN Glucose LESS THAN 70 milligrams/deciliter  glucagon  Injectable 1 milliGRAM(s) IntraMuscular once PRN Glucose LESS THAN 70 milligrams/deciliter

## 2020-05-20 NOTE — PROGRESS NOTE ADULT - ASSESSMENT
1.	Hyponatremia  2.	MAURICIO, CKD: CRS  3.	Diabetes  4.	Dyspnea: Multifactorial: COPD, Pulmonary hypertension  5.	Anemia  6.	Hypokalemia    Improving creatinine trend. Elevated bun also secondary to steroid Rx. Will hold diuretics for 24-48 hours then resume at lower dose.   Rx for COPD. Monitor blood sugar levels. Insulin coverage as needed. Dietary restriction. Potassium supps.   Will follow electrolytes and renal function trend.

## 2020-05-20 NOTE — PROGRESS NOTE ADULT - PROBLEM SELECTOR PLAN 7
Chronic HFpEF, with ?pulm HTN found on CT  - Hold diuresis today for hyponatremia. Monitor volume status.  - Continue Sildenafil  - Continue Cardizem  - Hold metolazone and spironolactone per Dr Staton  - Last documented TTE 5/2012 on our records  -Echo reviewed.  - Cardio (Dr Staton) consulted, recs appreciated

## 2020-05-20 NOTE — PROGRESS NOTE ADULT - ASSESSMENT
The patient is a 70 year old female with a history of HTN, DM, chronic diastolic heart failure, COPD, pulmonary hypertension who presents with shortness of breath, likely multifactorial from COPD, chronic diastolic heart failure.    Plan:  - Echo with right-sided heart failure, severe TR and severe pulm HTN  - BNP elevated at 5421  - CXR with clear lungs  - COVID-19 negative  - Continue torsemide 20 mg tid  - Hold metolazone  - Hold spironolactone  - Continue sildenafil 20 mg tid  - Continue diltiazem 120 mg daily  - Pulm follow-up  - MAURICIO - slightly improved. Labs pending today

## 2020-05-20 NOTE — PROGRESS NOTE ADULT - SUBJECTIVE AND OBJECTIVE BOX
Date/Time Patient Seen:  		  Referring MD:   Data Reviewed	       Patient is a 70y old  Female who presents with a chief complaint of acute hypoxemic respiratory failure (19 May 2020 15:48)      Subjective/HPI     PAST MEDICAL & SURGICAL HISTORY:  Hypertension  Type 2 diabetes mellitus  COPD with hypoxia  Pulmonary hypertension  History of Cholecystectomy  HTN (Hypertension)  Obstructive Sleep Apnea  COPD (Chronic Obstructive Pulmonary Disease)  Diabetes Mellitus Type II  S/P tubal ligation  S/P Cholecystectomy        Medication list         MEDICATIONS  (STANDING):  albuterol/ipratropium for Nebulization 3 milliLiter(s) Nebulizer every 6 hours  cholecalciferol 1000 Unit(s) Oral daily  dextrose 5%. 1000 milliLiter(s) (50 mL/Hr) IV Continuous <Continuous>  dextrose 5%. 1000 milliLiter(s) (50 mL/Hr) IV Continuous <Continuous>  dextrose 50% Injectable 12.5 Gram(s) IV Push once  dextrose 50% Injectable 25 Gram(s) IV Push once  dextrose 50% Injectable 25 Gram(s) IV Push once  dextrose 50% Injectable 12.5 Gram(s) IV Push once  dextrose 50% Injectable 25 Gram(s) IV Push once  dextrose 50% Injectable 25 Gram(s) IV Push once  diltiazem    milliGRAM(s) Oral daily  ferrous    sulfate 325 milliGRAM(s) Oral daily  folic acid 1 milliGRAM(s) Oral daily  insulin lispro (HumaLOG) corrective regimen sliding scale   SubCutaneous at bedtime  insulin lispro (HumaLOG) corrective regimen sliding scale   SubCutaneous three times a day before meals  methylPREDNISolone sodium succinate Injectable 40 milliGRAM(s) IV Push daily  multivitamin 1 Tablet(s) Oral daily  pantoprazole  Injectable 40 milliGRAM(s) IV Push every 12 hours  sertraline 100 milliGRAM(s) Oral daily  sildenafil (REVATIO) 20 milliGRAM(s) Oral three times a day  torsemide 20 milliGRAM(s) Oral <User Schedule>    MEDICATIONS  (PRN):  dextrose 40% Gel 15 Gram(s) Oral once PRN Blood Glucose LESS THAN 70 milliGRAM(s)/deciliter  dextrose 40% Gel 15 Gram(s) Oral once PRN Blood Glucose LESS THAN 70 milliGRAM(s)/deciliter  glucagon  Injectable 1 milliGRAM(s) IntraMuscular once PRN Glucose LESS THAN 70 milligrams/deciliter  glucagon  Injectable 1 milliGRAM(s) IntraMuscular once PRN Glucose LESS THAN 70 milligrams/deciliter         Vitals log        ICU Vital Signs Last 24 Hrs  T(C): 36.8 (20 May 2020 04:30), Max: 36.8 (20 May 2020 04:30)  T(F): 98.2 (20 May 2020 04:30), Max: 98.2 (20 May 2020 04:30)  HR: 85 (20 May 2020 05:06) (71 - 91)  BP: 103/71 (20 May 2020 04:30) (99/52 - 109/72)  BP(mean): --  ABP: --  ABP(mean): --  RR: 19 (20 May 2020 04:30) (19 - 22)  SpO2: 99% (20 May 2020 04:30) (90% - 99%)           Input and Output:  I&O's Detail    18 May 2020 07:01  -  19 May 2020 07:00  --------------------------------------------------------  IN:    IV PiggyBack: 300 mL    Packed Red Blood Cells: 300 mL  Total IN: 600 mL    OUT:    Incontinent per Collection Ba mL  Total OUT: 1125 mL    Total NET: -525 mL          Lab Data                        8.4    7.46  )-----------( 231      ( 19 May 2020 04:33 )             26.4     05-19    130<L>  |  83<L>  |  88<H>  ----------------------------<  137<H>  3.6   |  37<H>  |  2.00<H>    Ca    9.5      19 May 2020 04:33  Phos  4.4     05-19  Mg     2.3     05-19    TPro  7.1  /  Alb  3.3  /  TBili  0.8  /  DBili  x   /  AST  12<L>  /  ALT  14  /  AlkPhos  124<H>  05-19      CARDIAC MARKERS ( 18 May 2020 16:45 )  .000 ng/mL / x     / x     / x     / x            Review of Systems	      Objective     Physical Examination    heart s1s2  lung dec BS  abd soft      Pertinent Lab findings & Imaging      Billy:  NO   Adequate UO     I&O's Detail    18 May 2020 07:01  -  19 May 2020 07:00  --------------------------------------------------------  IN:    IV PiggyBack: 300 mL    Packed Red Blood Cells: 300 mL  Total IN: 600 mL    OUT:    Incontinent per Collection Ba mL  Total OUT: 1125 mL    Total NET: -525 mL               Discussed with:     Cultures:	        Radiology

## 2020-05-20 NOTE — PROGRESS NOTE ADULT - SUBJECTIVE AND OBJECTIVE BOX
Chief Complaint: Shortness of breath    Interval Events: No events overnight. Sleeping.    Review of Systems:  General: No fevers, chills, weight loss or gain  Skin: No rashes, color changes  Cardiovascular: No chest pain, orthopnea  Respiratory: No shortness of breath, cough  Gastrointestinal: No nausea, abdominal pain  Genitourinary: No incontinence, pain with urination  Musculoskeletal: No pain, swelling, decreased range of motion  Neurological: No headache, weakness  Psychiatric: No depression, anxiety  Endocrine: No weight loss or gain, increased thirst  All other systems are comprehensively negative.    Physical Exam:  Vital Signs Last 24 Hrs  T(C): 36.8 (20 May 2020 04:30), Max: 36.8 (20 May 2020 04:30)  T(F): 98.2 (20 May 2020 04:30), Max: 98.2 (20 May 2020 04:30)  HR: 85 (20 May 2020 05:06) (72 - 91)  BP: 103/71 (20 May 2020 04:30) (99/52 - 108/65)  BP(mean): --  RR: 19 (20 May 2020 04:30) (19 - 22)  SpO2: 99% (20 May 2020 04:30) (90% - 99%)  General: NAD  HEENT: MMM  Neck: No JVD, no carotid bruit  Lungs: CTAB  CV: RRR, nl S1/S2, no M/R/G  Abdomen: S/NT/ND, +BS  Extremities: No LE edema, no cyanosis  Neuro: AAOx3, non-focal  Skin: No rash    Labs:             05-19    130<L>  |  83<L>  |  88<H>  ----------------------------<  137<H>  3.6   |  37<H>  |  2.00<H>    Ca    9.5      19 May 2020 04:33  Phos  4.4     05-19  Mg     2.3     05-19    TPro  7.1  /  Alb  3.3  /  TBili  0.8  /  DBili  x   /  AST  12<L>  /  ALT  14  /  AlkPhos  124<H>  05-19                        8.4    7.46  )-----------( 231      ( 19 May 2020 04:33 )             26.4       Telemetry: Sinus rhythm

## 2020-05-20 NOTE — PROGRESS NOTE ADULT - PROBLEM SELECTOR PLAN 3
Seems to be improving. Unclear cause.  - CT abdomen/pelvis shows possible mild pancreatitis - Lipase 91 making pancreatitis unlikely.

## 2020-05-20 NOTE — PROGRESS NOTE ADULT - PROBLEM SELECTOR PLAN 4
Hyponatremia worsened again. Will stop diuresis for today and monitor.  Hypokalemia again today. Replete and repeat labs in AM.  -Monitor chemistries  - Nephro (Dr Goldsmith) consulted

## 2020-05-20 NOTE — PROGRESS NOTE ADULT - SUBJECTIVE AND OBJECTIVE BOX
Patient is a 70y old  Female who presents with a chief complaint of acute hypoxemic respiratory failure (20 May 2020 06:35)    Patient seen in follow up for hyponatremia, MAURICIO.       PAST MEDICAL HISTORY:  Hypertension  Type 2 diabetes mellitus  COPD with hypoxia  Pulmonary hypertension  History of Cholecystectomy  HTN (Hypertension)  Obstructive Sleep Apnea  COPD (Chronic Obstructive Pulmonary Disease)  Diabetes Mellitus Type II    MEDICATIONS  (STANDING):  albuterol/ipratropium for Nebulization 3 milliLiter(s) Nebulizer every 6 hours  cholecalciferol 1000 Unit(s) Oral daily  dextrose 5%. 1000 milliLiter(s) (50 mL/Hr) IV Continuous <Continuous>  dextrose 5%. 1000 milliLiter(s) (50 mL/Hr) IV Continuous <Continuous>  dextrose 50% Injectable 12.5 Gram(s) IV Push once  dextrose 50% Injectable 25 Gram(s) IV Push once  dextrose 50% Injectable 25 Gram(s) IV Push once  dextrose 50% Injectable 12.5 Gram(s) IV Push once  dextrose 50% Injectable 25 Gram(s) IV Push once  dextrose 50% Injectable 25 Gram(s) IV Push once  diltiazem    milliGRAM(s) Oral daily  ferrous    sulfate 325 milliGRAM(s) Oral daily  folic acid 1 milliGRAM(s) Oral daily  insulin lispro (HumaLOG) corrective regimen sliding scale   SubCutaneous at bedtime  insulin lispro (HumaLOG) corrective regimen sliding scale   SubCutaneous three times a day before meals  methylPREDNISolone sodium succinate Injectable 40 milliGRAM(s) IV Push daily  multivitamin 1 Tablet(s) Oral daily  pantoprazole  Injectable 40 milliGRAM(s) IV Push every 12 hours  potassium chloride    Tablet ER 40 milliEquivalent(s) Oral once  sertraline 100 milliGRAM(s) Oral daily  sildenafil (REVATIO) 20 milliGRAM(s) Oral three times a day    MEDICATIONS  (PRN):  dextrose 40% Gel 15 Gram(s) Oral once PRN Blood Glucose LESS THAN 70 milliGRAM(s)/deciliter  dextrose 40% Gel 15 Gram(s) Oral once PRN Blood Glucose LESS THAN 70 milliGRAM(s)/deciliter  glucagon  Injectable 1 milliGRAM(s) IntraMuscular once PRN Glucose LESS THAN 70 milligrams/deciliter  glucagon  Injectable 1 milliGRAM(s) IntraMuscular once PRN Glucose LESS THAN 70 milligrams/deciliter    T(C): 36.8 (05-20-20 @ 04:30), Max: 36.9 (05-19-20 @ 02:00)  HR: 85 (05-20-20 @ 05:06) (71 - 91)  BP: 103/71 (05-20-20 @ 04:30) (87/54 - 114/58)  RR: 19 (05-20-20 @ 04:30) (11 - 22)  SpO2: 99% (05-20-20 @ 04:30) (90% - 100%)  Wt(kg): --  I&O's Detail      PHYSICAL EXAM:  General: No distress  Respiratory: b/l air entry  Cardiovascular: S1 S2  Gastrointestinal: soft  Extremities: edema                              8.5    4.28  )-----------( 214      ( 20 May 2020 09:31 )             26.5     05-20    126<L>  |  83<L>  |  81<H>  ----------------------------<  162<H>  3.4<L>   |  34<H>  |  1.80<H>    Ca    10.1      20 May 2020 09:31  Phos  4.6     05-20  Mg     2.4     05-20    TPro  7.1  /  Alb  3.3  /  TBili  0.8  /  DBili  x   /  AST  12<L>  /  ALT  14  /  AlkPhos  124<H>  05-19    CARDIAC MARKERS ( 18 May 2020 16:45 )  .000 ng/mL / x     / x     / x     / x          LIVER FUNCTIONS - ( 19 May 2020 04:33 )  Alb: 3.3 g/dL / Pro: 7.1 g/dL / ALK PHOS: 124 U/L / ALT: 14 U/L / AST: 12 U/L / GGT: x               Sodium, Serum: 126 (05-20 @ 09:31)  Sodium, Serum: 130 (05-19 @ 04:33)  Sodium, Serum: 125 (05-18 @ 19:50)  Sodium, Serum: 124 (05-18 @ 16:45)    Creatinine, Serum: 1.80 (05-20 @ 09:31)  Creatinine, Serum: 2.00 (05-19 @ 04:33)  Creatinine, Serum: 2.20 (05-18 @ 19:50)  Creatinine, Serum: 1.93 (05-18 @ 16:45)    Potassium, Serum: 3.4 (05-20 @ 09:31)  Potassium, Serum: 3.6 (05-19 @ 04:33)  Potassium, Serum: 3.0 (05-18 @ 19:50)  Potassium, Serum: 3.0 (05-18 @ 16:45)    Hemoglobin: 8.5 (05-20 @ 09:31)  Hemoglobin: 8.4 (05-19 @ 04:33)  Hemoglobin: 7.4 (05-18 @ 19:50)  Hemoglobin: 7.4 (05-18 @ 16:45)

## 2020-05-21 LAB
ANION GAP SERPL CALC-SCNC: 11 MMOL/L — SIGNIFICANT CHANGE UP (ref 5–17)
BUN SERPL-MCNC: 87 MG/DL — HIGH (ref 7–23)
CALCIUM SERPL-MCNC: 10.4 MG/DL — HIGH (ref 8.5–10.1)
CHLORIDE SERPL-SCNC: 85 MMOL/L — LOW (ref 96–108)
CO2 SERPL-SCNC: 31 MMOL/L — SIGNIFICANT CHANGE UP (ref 22–31)
CREAT SERPL-MCNC: 1.8 MG/DL — HIGH (ref 0.5–1.3)
GLUCOSE SERPL-MCNC: 167 MG/DL — HIGH (ref 70–99)
HCT VFR BLD CALC: 28 % — LOW (ref 34.5–45)
HGB BLD-MCNC: 8.8 G/DL — LOW (ref 11.5–15.5)
MCHC RBC-ENTMCNC: 25.4 PG — LOW (ref 27–34)
MCHC RBC-ENTMCNC: 31.4 GM/DL — LOW (ref 32–36)
MCV RBC AUTO: 80.7 FL — SIGNIFICANT CHANGE UP (ref 80–100)
NRBC # BLD: 0 /100 WBCS — SIGNIFICANT CHANGE UP (ref 0–0)
PLATELET # BLD AUTO: 257 K/UL — SIGNIFICANT CHANGE UP (ref 150–400)
POTASSIUM SERPL-MCNC: 4.2 MMOL/L — SIGNIFICANT CHANGE UP (ref 3.5–5.3)
POTASSIUM SERPL-SCNC: 4.2 MMOL/L — SIGNIFICANT CHANGE UP (ref 3.5–5.3)
RBC # BLD: 3.47 M/UL — LOW (ref 3.8–5.2)
RBC # FLD: 15.2 % — HIGH (ref 10.3–14.5)
S PNEUM AG UR QL: NEGATIVE — SIGNIFICANT CHANGE UP
SODIUM SERPL-SCNC: 127 MMOL/L — LOW (ref 135–145)
WBC # BLD: 11.29 K/UL — HIGH (ref 3.8–10.5)
WBC # FLD AUTO: 11.29 K/UL — HIGH (ref 3.8–10.5)

## 2020-05-21 PROCEDURE — 99232 SBSQ HOSP IP/OBS MODERATE 35: CPT

## 2020-05-21 RX ORDER — SODIUM CHLORIDE 9 MG/ML
2 INJECTION INTRAMUSCULAR; INTRAVENOUS; SUBCUTANEOUS EVERY 6 HOURS
Refills: 0 | Status: COMPLETED | OUTPATIENT
Start: 2020-05-21 | End: 2020-05-21

## 2020-05-21 RX ADMIN — Medication 650 MILLIGRAM(S): at 06:33

## 2020-05-21 RX ADMIN — Medication 40 MILLIGRAM(S): at 05:55

## 2020-05-21 RX ADMIN — Medication 1 MILLIGRAM(S): at 12:22

## 2020-05-21 RX ADMIN — Medication 2: at 17:05

## 2020-05-21 RX ADMIN — Medication 3 MILLILITER(S): at 20:02

## 2020-05-21 RX ADMIN — Medication 20 MILLIGRAM(S): at 21:58

## 2020-05-21 RX ADMIN — SODIUM CHLORIDE 2 GRAM(S): 9 INJECTION INTRAMUSCULAR; INTRAVENOUS; SUBCUTANEOUS at 17:57

## 2020-05-21 RX ADMIN — Medication 2: at 12:20

## 2020-05-21 RX ADMIN — PANTOPRAZOLE SODIUM 40 MILLIGRAM(S): 20 TABLET, DELAYED RELEASE ORAL at 17:33

## 2020-05-21 RX ADMIN — Medication 650 MILLIGRAM(S): at 19:57

## 2020-05-21 RX ADMIN — Medication 3 MILLILITER(S): at 08:41

## 2020-05-21 RX ADMIN — Medication 1 TABLET(S): at 12:23

## 2020-05-21 RX ADMIN — Medication 20 MILLIGRAM(S): at 13:28

## 2020-05-21 RX ADMIN — SERTRALINE 100 MILLIGRAM(S): 25 TABLET, FILM COATED ORAL at 12:23

## 2020-05-21 RX ADMIN — SODIUM CHLORIDE 2 GRAM(S): 9 INJECTION INTRAMUSCULAR; INTRAVENOUS; SUBCUTANEOUS at 22:00

## 2020-05-21 RX ADMIN — Medication 325 MILLIGRAM(S): at 12:23

## 2020-05-21 RX ADMIN — Medication 3 MILLILITER(S): at 13:14

## 2020-05-21 RX ADMIN — PANTOPRAZOLE SODIUM 40 MILLIGRAM(S): 20 TABLET, DELAYED RELEASE ORAL at 05:55

## 2020-05-21 RX ADMIN — Medication 20 MILLIGRAM(S): at 05:55

## 2020-05-21 RX ADMIN — Medication 0: at 21:58

## 2020-05-21 RX ADMIN — Medication 1000 UNIT(S): at 12:21

## 2020-05-21 RX ADMIN — Medication 1: at 08:05

## 2020-05-21 NOTE — PROGRESS NOTE ADULT - SUBJECTIVE AND OBJECTIVE BOX
ADMISSION HPI:  70 year old Female with PMHx of COPD (on home O2 via NC 5-6 L), chronic HFpEF (last documented TTE 5/2012), HTN, DM2, anxiety who presented with c/o SOB  and fatigue for four days. Patient also describes having a cough productive of white sputum, worsening lower extremity edema, left chest discomfort, and nausea. Every day her symptoms get worse, and are especially bad when she is trying to get around the house. Patient is on 5-6L O2 at home typically saturating in the low 90s. Daughter gave her nasal saline for breathing, which was not effective. With worsening SOB and LE edema, daughters called her cardiologist who prescribed a second diuretic metazolone along with torsemide 20 mg TID. Two days ago, daughter called her cardiologist, [place] patient was over diuresed and stopped her diuretics for the past two days. She denies fevers/chills, headache, vomiting, sick contacts.         Patient was transferred from [facility] on 5/18 with the following vitals:   T: 97.4, HR 84, /67, RR: 18, SpO2 94% on non-rebreather.  CBC significant for Hgb 7.4 (in 2014 hgb 13.2), D-dimer <150. INR/PT/Ptt 1.26/14.1/27.3. CMP sig for Na 124-->125 s/p 1L NS at [facility], K 3.0 (3.0 in pl ED s/p 40KCl PO x1), Cl 81, CO2 37, BUN/Cr 96/1.93-->90/2.20 (in [facility] ED). Trops x 1 negative. Serum BNP 5421 5421. Lactate negative  CXR: The lungs are clear. The heart size is mildly enlarged. Multilevel degenerative changes are noted within the imaged potions of the spine.  EKG: NSR with RBBB per cardio note  Blood culture, CRP and Procalcitonin are in process  Meds given: tylenol 650, NS 1 liter bolus, 40KCl PO (18 May 2020 19:41)    INTERVAL HPI:  Patient seen and examined. No acute overnight events. Sleeping, but once awake answers questions appropriately. She says she slept comfortably part of the night while wearing CPAP. She denies shortness of breath presently. Denies chest pain. Reports she did not have a bowel movement yesterday or today so far. She declines stool softener at this time.     REVIEW OF SYSTEMS:  As per HPI.    VITAL SIGNS:  Vital Signs Last 24 Hrs  T(C): 36.7 (21 May 2020 04:36), Max: 36.7 (20 May 2020 14:16)  T(F): 98 (21 May 2020 04:36), Max: 98 (20 May 2020 14:16)  HR: 79 (21 May 2020 08:43) (75 - 88)  BP: 101/56 (21 May 2020 04:36) (87/51 - 110/64)  BP(mean): --  RR: 19 (21 May 2020 04:36) (19 - 20)  SpO2: 96% (21 May 2020 08:43) (94% - 98%)    PHYSICAL EXAM:   GENERAL: obese elderly female, no acute distress  HEENT: NC/AT, EOMI, neck supple, MMM  RESPIRATORY: LCTAB/L, no rhonchi, rales, or wheezing  CARDIOVASCULAR: RRR, no murmurs, gallops, rubs  ABDOMINAL: soft, non-tender, non-distended, positive bowel sounds   EXTREMITIES: no clubbing, cyanosis, +1-2 pitting edema bilateral LEs  NEUROLOGICAL: somnolent, but rousable, non-focal  SKIN: warm, dry, intact  MUSCULOSKELETAL: no gross joint deformity                          8.8    x     )-----------( 257      ( 21 May 2020 09:51 )             28.0   05-21    127<L>  |  85<L>  |  87<H>  ----------------------------<  167<H>  4.2   |  31  |  1.80<H>    Ca    10.4<H>      21 May 2020 09:51  Phos  4.6     05-20  Mg     2.4     05-20      CAPILLARY BLOOD GLUCOSE  POCT Blood Glucose.: 182 mg/dL (21 May 2020 07:31)  POCT Blood Glucose.: 209 mg/dL (20 May 2020 21:23)  POCT Blood Glucose.: 210 mg/dL (20 May 2020 16:54)  POCT Blood Glucose.: 239 mg/dL (20 May 2020 12:05)        MEDICATIONS  (STANDING):  albuterol/ipratropium for Nebulization 3 milliLiter(s) Nebulizer every 6 hours  cholecalciferol 1000 Unit(s) Oral daily  dextrose 5%. 1000 milliLiter(s) (50 mL/Hr) IV Continuous <Continuous>  dextrose 5%. 1000 milliLiter(s) (50 mL/Hr) IV Continuous <Continuous>  dextrose 50% Injectable 12.5 Gram(s) IV Push once  dextrose 50% Injectable 25 Gram(s) IV Push once  dextrose 50% Injectable 25 Gram(s) IV Push once  dextrose 50% Injectable 12.5 Gram(s) IV Push once  dextrose 50% Injectable 25 Gram(s) IV Push once  dextrose 50% Injectable 25 Gram(s) IV Push once  diltiazem    milliGRAM(s) Oral daily  ferrous    sulfate 325 milliGRAM(s) Oral daily  folic acid 1 milliGRAM(s) Oral daily  insulin lispro (HumaLOG) corrective regimen sliding scale   SubCutaneous at bedtime  insulin lispro (HumaLOG) corrective regimen sliding scale   SubCutaneous three times a day before meals  methylPREDNISolone sodium succinate Injectable 40 milliGRAM(s) IV Push daily  multivitamin 1 Tablet(s) Oral daily  pantoprazole  Injectable 40 milliGRAM(s) IV Push every 12 hours  sertraline 100 milliGRAM(s) Oral daily  sildenafil (REVATIO) 20 milliGRAM(s) Oral three times a day    MEDICATIONS  (PRN):  acetaminophen   Tablet .. 650 milliGRAM(s) Oral every 6 hours PRN Mild Pain (1 - 3), Moderate Pain (4 - 6)  dextrose 40% Gel 15 Gram(s) Oral once PRN Blood Glucose LESS THAN 70 milliGRAM(s)/deciliter  dextrose 40% Gel 15 Gram(s) Oral once PRN Blood Glucose LESS THAN 70 milliGRAM(s)/deciliter  glucagon  Injectable 1 milliGRAM(s) IntraMuscular once PRN Glucose LESS THAN 70 milligrams/deciliter  glucagon  Injectable 1 milliGRAM(s) IntraMuscular once PRN Glucose LESS THAN 70 milligrams/deciliter

## 2020-05-21 NOTE — PROGRESS NOTE ADULT - PROBLEM SELECTOR PLAN 4
Persistent hyponatremia, slight increase from 126 yesterday to 127 today.  -Monitor chemistries  - Nephro (Dr Goldsmith) consulted

## 2020-05-21 NOTE — PROGRESS NOTE ADULT - SUBJECTIVE AND OBJECTIVE BOX
Patient is a 70y old  Female who presents with a chief complaint of acute hypoxemic respiratory failure (20 May 2020 06:35)  Patient seen in follow up for hyponatremia, MAURICIO.     Pt resting in bed. Lethargic but arousable.       PAST MEDICAL HISTORY:  Hypertension  Type 2 diabetes mellitus  COPD with hypoxia  Pulmonary hypertension  History of Cholecystectomy  HTN (Hypertension)  Obstructive Sleep Apnea  COPD (Chronic Obstructive Pulmonary Disease)  Diabetes Mellitus Type II    MEDICATIONS  (STANDING):  albuterol/ipratropium for Nebulization 3 milliLiter(s) Nebulizer every 6 hours  cholecalciferol 1000 Unit(s) Oral daily  dextrose 5%. 1000 milliLiter(s) (50 mL/Hr) IV Continuous <Continuous>  dextrose 5%. 1000 milliLiter(s) (50 mL/Hr) IV Continuous <Continuous>  dextrose 50% Injectable 12.5 Gram(s) IV Push once  dextrose 50% Injectable 25 Gram(s) IV Push once  dextrose 50% Injectable 25 Gram(s) IV Push once  dextrose 50% Injectable 12.5 Gram(s) IV Push once  dextrose 50% Injectable 25 Gram(s) IV Push once  dextrose 50% Injectable 25 Gram(s) IV Push once  diltiazem    milliGRAM(s) Oral daily  ferrous    sulfate 325 milliGRAM(s) Oral daily  folic acid 1 milliGRAM(s) Oral daily  insulin lispro (HumaLOG) corrective regimen sliding scale   SubCutaneous at bedtime  insulin lispro (HumaLOG) corrective regimen sliding scale   SubCutaneous three times a day before meals  methylPREDNISolone sodium succinate Injectable 40 milliGRAM(s) IV Push daily  multivitamin 1 Tablet(s) Oral daily  pantoprazole  Injectable 40 milliGRAM(s) IV Push every 12 hours  sertraline 100 milliGRAM(s) Oral daily  sildenafil (REVATIO) 20 milliGRAM(s) Oral three times a day  sodium chloride 2 Gram(s) Oral every 6 hours    MEDICATIONS  (PRN):  acetaminophen   Tablet .. 650 milliGRAM(s) Oral every 6 hours PRN Mild Pain (1 - 3), Moderate Pain (4 - 6)  dextrose 40% Gel 15 Gram(s) Oral once PRN Blood Glucose LESS THAN 70 milliGRAM(s)/deciliter  dextrose 40% Gel 15 Gram(s) Oral once PRN Blood Glucose LESS THAN 70 milliGRAM(s)/deciliter  glucagon  Injectable 1 milliGRAM(s) IntraMuscular once PRN Glucose LESS THAN 70 milligrams/deciliter  glucagon  Injectable 1 milliGRAM(s) IntraMuscular once PRN Glucose LESS THAN 70 milligrams/deciliter    T(C): 36.7 (05-21-20 @ 04:36), Max: 36.8 (05-20-20 @ 04:30)  HR: 96 (05-21-20 @ 13:14) (72 - 96)  BP: 101/56 (05-21-20 @ 04:36) (87/51 - 110/64)  RR: 19 (05-21-20 @ 04:36)  SpO2: 94% (05-21-20 @ 13:14)  Wt(kg): --  I&O's Detail    21 May 2020 07:01  -  21 May 2020 14:02  --------------------------------------------------------  IN:    Oral Fluid: 360 mL  Total IN: 360 mL    OUT:  Total OUT: 0 mL    Total NET: 360 mL          PHYSICAL EXAM:  General: No distress  Respiratory: b/l air entry  Cardiovascular: S1 S2  Gastrointestinal: soft  Extremities: edema                                LABORATORY:                        8.8    11.29 )-----------( 257      ( 21 May 2020 09:51 )             28.0     05-21    127<L>  |  85<L>  |  87<H>  ----------------------------<  167<H>  4.2   |  31  |  1.80<H>    Ca    10.4<H>      21 May 2020 09:51  Phos  4.6     05-20  Mg     2.4     05-20      Sodium, Serum: 127 mmol/L (05-21 @ 09:51)  Sodium, Serum: 126 mmol/L (05-20 @ 09:31)    Potassium, Serum: 4.2 mmol/L (05-21 @ 09:51)  Potassium, Serum: 3.4 mmol/L (05-20 @ 09:31)    Hemoglobin: 8.8 g/dL (05-21 @ 09:51)  Hemoglobin: 8.5 g/dL (05-20 @ 09:31)  Hemoglobin: 8.4 g/dL (05-19 @ 04:33)  Hemoglobin: 7.4 g/dL (05-18 @ 19:50)    Creatinine, Serum 1.80 (05-21 @ 09:51)  Creatinine, Serum 1.80 (05-20 @ 09:31)  Creatinine, Serum 2.00 (05-19 @ 04:33)  Creatinine, Serum 2.20 (05-18 @ 19:50)

## 2020-05-21 NOTE — PROGRESS NOTE ADULT - PROBLEM SELECTOR PLAN 2
Anemia likely 2/2 GIB - Iron Deficiency vs ?hemorrhoidal, may be contributing to hypoxemia. Per daughter Hgb was 12 back in 8/2019.   - As per daughter, aid reports blood in patients stool.  - Continue home iron and folic acid  - Patient's physician reports she has chronic rectal bleeding  - s/p 1 U PRBCs this admission.  - Guiac ordered, still not sent.

## 2020-05-21 NOTE — PROGRESS NOTE ADULT - ASSESSMENT
The patient is a 70 year old female with a history of HTN, DM, chronic diastolic heart failure, COPD, pulmonary hypertension who presents with shortness of breath, likely multifactorial from COPD, chronic diastolic heart failure.    Plan:  - Echo with right-sided heart failure, severe TR and severe pulm HTN  - BNP elevated at 5421  - CXR with clear lungs  - COVID-19 negative  - Torsemide on hold; resume tomorrow  - Hold metolazone  - Resume spironolactone tomorrow  - Continue sildenafil 20 mg tid  - Continue diltiazem 120 mg daily  - Pulm follow-up  - Agree that patient would benefit from evaluation at tertiary center with a pulmonary hypertension specialist

## 2020-05-21 NOTE — PHYSICAL THERAPY INITIAL EVALUATION ADULT - ADDITIONAL COMMENTS
Pt lives in a basement apt and daughter and her family live upstairs. Pt has steps to enter/exit but uses ambulette or ambulance- pt does not negotiate steps. pt ambulates independently with rollator and requires assistance for ADLs. Pt has aide 7xwk for 5-6 hours daily. Pt uses home O2 24/7. Pt has wheelchair for longer distances.

## 2020-05-21 NOTE — PROGRESS NOTE ADULT - PROBLEM SELECTOR PLAN 7
Chronic HFpEF, with ?pulm HTN found on CT  - Continue to hold diuresis today for hyponatremia. Monitor volume status. Restart tomorrow per Cardio.  - Continue Sildenafil  - Continue Cardizem  - Hold metolazone and spironolactone per Dr Staton  - Last documented TTE 5/2012 on our records  -Echo reviewed.  - Cardio (Dr Staton) consulted, recs appreciated

## 2020-05-21 NOTE — PROGRESS NOTE ADULT - SUBJECTIVE AND OBJECTIVE BOX
Date/Time Patient Seen:  		  Referring MD:   Data Reviewed	       Patient is a 70y old  Female who presents with a chief complaint of acute hypoxemic respiratory failure (20 May 2020 10:40)      Subjective/HPI     PAST MEDICAL & SURGICAL HISTORY:  Hypertension  Type 2 diabetes mellitus  COPD with hypoxia  Pulmonary hypertension  History of Cholecystectomy  HTN (Hypertension)  Obstructive Sleep Apnea  COPD (Chronic Obstructive Pulmonary Disease)  Diabetes Mellitus Type II  S/P tubal ligation  S/P Cholecystectomy        Medication list         MEDICATIONS  (STANDING):  albuterol/ipratropium for Nebulization 3 milliLiter(s) Nebulizer every 6 hours  cholecalciferol 1000 Unit(s) Oral daily  dextrose 5%. 1000 milliLiter(s) (50 mL/Hr) IV Continuous <Continuous>  dextrose 5%. 1000 milliLiter(s) (50 mL/Hr) IV Continuous <Continuous>  dextrose 50% Injectable 12.5 Gram(s) IV Push once  dextrose 50% Injectable 25 Gram(s) IV Push once  dextrose 50% Injectable 25 Gram(s) IV Push once  dextrose 50% Injectable 12.5 Gram(s) IV Push once  dextrose 50% Injectable 25 Gram(s) IV Push once  dextrose 50% Injectable 25 Gram(s) IV Push once  diltiazem    milliGRAM(s) Oral daily  ferrous    sulfate 325 milliGRAM(s) Oral daily  folic acid 1 milliGRAM(s) Oral daily  insulin lispro (HumaLOG) corrective regimen sliding scale   SubCutaneous at bedtime  insulin lispro (HumaLOG) corrective regimen sliding scale   SubCutaneous three times a day before meals  methylPREDNISolone sodium succinate Injectable 40 milliGRAM(s) IV Push daily  multivitamin 1 Tablet(s) Oral daily  pantoprazole  Injectable 40 milliGRAM(s) IV Push every 12 hours  sertraline 100 milliGRAM(s) Oral daily  sildenafil (REVATIO) 20 milliGRAM(s) Oral three times a day    MEDICATIONS  (PRN):  acetaminophen   Tablet .. 650 milliGRAM(s) Oral every 6 hours PRN Mild Pain (1 - 3), Moderate Pain (4 - 6)  dextrose 40% Gel 15 Gram(s) Oral once PRN Blood Glucose LESS THAN 70 milliGRAM(s)/deciliter  dextrose 40% Gel 15 Gram(s) Oral once PRN Blood Glucose LESS THAN 70 milliGRAM(s)/deciliter  glucagon  Injectable 1 milliGRAM(s) IntraMuscular once PRN Glucose LESS THAN 70 milligrams/deciliter  glucagon  Injectable 1 milliGRAM(s) IntraMuscular once PRN Glucose LESS THAN 70 milligrams/deciliter         Vitals log        ICU Vital Signs Last 24 Hrs  T(C): 36.7 (21 May 2020 04:36), Max: 36.7 (20 May 2020 14:16)  T(F): 98 (21 May 2020 04:36), Max: 98 (20 May 2020 14:16)  HR: 87 (21 May 2020 04:36) (75 - 88)  BP: 101/56 (21 May 2020 04:36) (87/51 - 110/64)  BP(mean): --  ABP: --  ABP(mean): --  RR: 19 (21 May 2020 04:36) (19 - 20)  SpO2: 97% (21 May 2020 04:36) (94% - 99%)           Input and Output:  I&O's Detail      Lab Data                        8.5    4.28  )-----------( 214      ( 20 May 2020 09:31 )             26.5     05-20    126<L>  |  83<L>  |  81<H>  ----------------------------<  162<H>  3.4<L>   |  34<H>  |  1.80<H>    Ca    10.1      20 May 2020 09:31  Phos  4.6     05-20  Mg     2.4     05-20              Review of Systems	      Objective     Physical Examination    heart s1s2  lung dec BS    Pertinent Lab findings & Imaging      Billy:  NO   Adequate UO     I&O's Detail           Discussed with:     Cultures:	        Radiology

## 2020-05-21 NOTE — PROGRESS NOTE ADULT - ASSESSMENT
1.	Hyponatremia: Likely multifactorial. Diuretic therapy, Increased ADH State  2.	MAURICIO, CKD: CRS  3.	Diabetes  4.	Dyspnea: Multifactorial: COPD, Pulmonary hypertension  5.	Anemia  6.	Hypokalemia    Stable renal indices. Hold diuretics for now. PO fluid restriction. Elevated bun also secondary to steroid Rx.   Rx for COPD. Monitor blood sugar levels. Insulin coverage as needed. Dietary restriction. PRN Potassium supps.   Will follow electrolytes and renal function trend.

## 2020-05-21 NOTE — PROGRESS NOTE ADULT - PROBLEM SELECTOR PLAN 3
Unclear cause but has resolved.  - CT abdomen/pelvis shows possible mild pancreatitis - Lipase 91 making pancreatitis unlikely.

## 2020-05-21 NOTE — PROGRESS NOTE ADULT - SUBJECTIVE AND OBJECTIVE BOX
Chief Complaint: Shortness of breath    Interval Events: No events overnight. No complaints.    Review of Systems:  General: No fevers, chills, weight loss or gain  Skin: No rashes, color changes  Cardiovascular: No chest pain, orthopnea  Respiratory: No shortness of breath, cough  Gastrointestinal: No nausea, abdominal pain  Genitourinary: No incontinence, pain with urination  Musculoskeletal: No pain, swelling, decreased range of motion  Neurological: No headache, weakness  Psychiatric: No depression, anxiety  Endocrine: No weight loss or gain, increased thirst  All other systems are comprehensively negative.    Physical Exam:  Vital Signs Last 24 Hrs  T(C): 36.7 (21 May 2020 04:36), Max: 36.7 (20 May 2020 14:16)  T(F): 98 (21 May 2020 04:36), Max: 98 (20 May 2020 14:16)  HR: 79 (21 May 2020 08:43) (75 - 88)  BP: 101/56 (21 May 2020 04:36) (87/51 - 110/64)  BP(mean): --  RR: 19 (21 May 2020 04:36) (19 - 20)  SpO2: 96% (21 May 2020 08:43) (94% - 98%)  General: NAD  HEENT: MMM  Neck: No JVD, no carotid bruit  Lungs: CTAB  CV: RRR, nl S1/S2, no M/R/G  Abdomen: S/NT/ND, +BS  Extremities: No LE edema, no cyanosis  Neuro: AAOx3, non-focal  Skin: No rash    Labs:             05-20    126<L>  |  83<L>  |  81<H>  ----------------------------<  162<H>  3.4<L>   |  34<H>  |  1.80<H>    Ca    10.1      20 May 2020 09:31  Phos  4.6     05-20  Mg     2.4     05-20                          8.8    x     )-----------( 257      ( 21 May 2020 09:51 )             28.0       Telemetry: Sinus rhythm

## 2020-05-22 ENCOUNTER — TRANSCRIPTION ENCOUNTER (OUTPATIENT)
Age: 70
End: 2020-05-22

## 2020-05-22 LAB
ANION GAP SERPL CALC-SCNC: 10 MMOL/L — SIGNIFICANT CHANGE UP (ref 5–17)
BUN SERPL-MCNC: 83 MG/DL — HIGH (ref 7–23)
CALCIUM SERPL-MCNC: 10.7 MG/DL — HIGH (ref 8.5–10.1)
CHLORIDE SERPL-SCNC: 88 MMOL/L — LOW (ref 96–108)
CO2 SERPL-SCNC: 33 MMOL/L — HIGH (ref 22–31)
CREAT SERPL-MCNC: 1.7 MG/DL — HIGH (ref 0.5–1.3)
GLUCOSE SERPL-MCNC: 159 MG/DL — HIGH (ref 70–99)
HCT VFR BLD CALC: 28.6 % — LOW (ref 34.5–45)
HGB BLD-MCNC: 8.8 G/DL — LOW (ref 11.5–15.5)
MCHC RBC-ENTMCNC: 25.3 PG — LOW (ref 27–34)
MCHC RBC-ENTMCNC: 30.8 GM/DL — LOW (ref 32–36)
MCV RBC AUTO: 82.2 FL — SIGNIFICANT CHANGE UP (ref 80–100)
NRBC # BLD: 0 /100 WBCS — SIGNIFICANT CHANGE UP (ref 0–0)
PLATELET # BLD AUTO: 243 K/UL — SIGNIFICANT CHANGE UP (ref 150–400)
POTASSIUM SERPL-MCNC: 3.9 MMOL/L — SIGNIFICANT CHANGE UP (ref 3.5–5.3)
POTASSIUM SERPL-SCNC: 3.9 MMOL/L — SIGNIFICANT CHANGE UP (ref 3.5–5.3)
RBC # BLD: 3.48 M/UL — LOW (ref 3.8–5.2)
RBC # FLD: 14.9 % — HIGH (ref 10.3–14.5)
SODIUM SERPL-SCNC: 131 MMOL/L — LOW (ref 135–145)
WBC # BLD: 9.45 K/UL — SIGNIFICANT CHANGE UP (ref 3.8–10.5)
WBC # FLD AUTO: 9.45 K/UL — SIGNIFICANT CHANGE UP (ref 3.8–10.5)

## 2020-05-22 PROCEDURE — 99232 SBSQ HOSP IP/OBS MODERATE 35: CPT

## 2020-05-22 RX ORDER — POLYETHYLENE GLYCOL 3350 17 G/17G
17 POWDER, FOR SOLUTION ORAL ONCE
Refills: 0 | Status: COMPLETED | OUTPATIENT
Start: 2020-05-22 | End: 2020-05-22

## 2020-05-22 RX ORDER — SENNA PLUS 8.6 MG/1
2 TABLET ORAL ONCE
Refills: 0 | Status: COMPLETED | OUTPATIENT
Start: 2020-05-22 | End: 2020-05-22

## 2020-05-22 RX ORDER — SIMETHICONE 80 MG/1
80 TABLET, CHEWABLE ORAL ONCE
Refills: 0 | Status: COMPLETED | OUTPATIENT
Start: 2020-05-22 | End: 2020-05-22

## 2020-05-22 RX ORDER — SODIUM CHLORIDE 9 MG/ML
2 INJECTION INTRAMUSCULAR; INTRAVENOUS; SUBCUTANEOUS ONCE
Refills: 0 | Status: COMPLETED | OUTPATIENT
Start: 2020-05-22 | End: 2020-05-22

## 2020-05-22 RX ADMIN — PANTOPRAZOLE SODIUM 40 MILLIGRAM(S): 20 TABLET, DELAYED RELEASE ORAL at 05:43

## 2020-05-22 RX ADMIN — POLYETHYLENE GLYCOL 3350 17 GRAM(S): 17 POWDER, FOR SOLUTION ORAL at 21:46

## 2020-05-22 RX ADMIN — Medication 20 MILLIGRAM(S): at 05:43

## 2020-05-22 RX ADMIN — Medication 1: at 12:30

## 2020-05-22 RX ADMIN — SERTRALINE 100 MILLIGRAM(S): 25 TABLET, FILM COATED ORAL at 11:47

## 2020-05-22 RX ADMIN — Medication 1 MILLIGRAM(S): at 11:47

## 2020-05-22 RX ADMIN — Medication 3 MILLILITER(S): at 20:21

## 2020-05-22 RX ADMIN — Medication 325 MILLIGRAM(S): at 11:47

## 2020-05-22 RX ADMIN — Medication 3 MILLILITER(S): at 13:18

## 2020-05-22 RX ADMIN — Medication 20 MILLIGRAM(S): at 21:45

## 2020-05-22 RX ADMIN — SODIUM CHLORIDE 2 GRAM(S): 9 INJECTION INTRAMUSCULAR; INTRAVENOUS; SUBCUTANEOUS at 15:18

## 2020-05-22 RX ADMIN — SIMETHICONE 80 MILLIGRAM(S): 80 TABLET, CHEWABLE ORAL at 21:45

## 2020-05-22 RX ADMIN — Medication 120 MILLIGRAM(S): at 05:43

## 2020-05-22 RX ADMIN — Medication 40 MILLIGRAM(S): at 05:43

## 2020-05-22 RX ADMIN — Medication 1: at 07:48

## 2020-05-22 RX ADMIN — Medication 20 MILLIGRAM(S): at 11:47

## 2020-05-22 RX ADMIN — Medication 1: at 21:45

## 2020-05-22 RX ADMIN — Medication 1000 UNIT(S): at 11:47

## 2020-05-22 RX ADMIN — Medication 20 MILLIGRAM(S): at 08:20

## 2020-05-22 RX ADMIN — Medication 20 MILLIGRAM(S): at 15:12

## 2020-05-22 RX ADMIN — Medication 3 MILLILITER(S): at 02:37

## 2020-05-22 RX ADMIN — SENNA PLUS 2 TABLET(S): 8.6 TABLET ORAL at 21:45

## 2020-05-22 RX ADMIN — POLYETHYLENE GLYCOL 3350 17 GRAM(S): 17 POWDER, FOR SOLUTION ORAL at 12:25

## 2020-05-22 RX ADMIN — Medication 1 TABLET(S): at 11:47

## 2020-05-22 RX ADMIN — Medication 3 MILLILITER(S): at 07:48

## 2020-05-22 RX ADMIN — PANTOPRAZOLE SODIUM 40 MILLIGRAM(S): 20 TABLET, DELAYED RELEASE ORAL at 17:06

## 2020-05-22 RX ADMIN — Medication 2: at 17:05

## 2020-05-22 RX ADMIN — Medication 3: at 12:26

## 2020-05-22 NOTE — PROGRESS NOTE ADULT - PROBLEM SELECTOR PLAN 2
Anemia likely 2/2 GIB - Iron Deficiency vs ?hemorrhoidal, may be contributing to hypoxemia.   Hemoglobin stable today.  Per daughter Hgb was 12 back in 8/2019.   - As per daughter, aid reports blood in patients stool.  - Continue home iron and folic acid  - Patient's physician reports she has chronic rectal bleeding  - s/p 1 U PRBCs this admission.  - Guiac ordered, still not sent.

## 2020-05-22 NOTE — PROGRESS NOTE ADULT - NSHPATTENDINGPLANDISCUSS_GEN_ALL_CORE
patient and daughter re: respiratory status, diuretics, discharge planning
patient and daughter re: diuresis, kidney function, anticipated hospital course, appropriateness of hospice care, DNR status
patient and daughter re: diuresis, kidney function, anticipated hospital course, pt eval

## 2020-05-22 NOTE — GOALS OF CARE CONVERSATION - ADVANCED CARE PLANNING - CONVERSATION DETAILS
met pt, reviewed copy of molst, pt unsure where original is located, pt agrees to updating molst form: pt agrees to Limited medical intervention, pt is DNR DNI, no tube feed, wants IV trial & antibiotics. Dr Escamilla to complete molst form. PC RN contact # given.

## 2020-05-22 NOTE — PROGRESS NOTE ADULT - PROBLEM SELECTOR PLAN 6
MAURICIO likely prerenal azotemia, overdiuresis.  no signs of obstruction on imaging.  - Avoid nephrotoxic drugs  - Nephro (Dr Goldsmith) consulted.
MAURICIO likely prerenal azotemia, overdiuresis. ? Cardiorenal syndrome?   Improving today.  No signs of obstruction on imaging.  - Avoid nephrotoxic drugs  - Nephro (Dr Goldsmith) consulted.
MAURICIO likely prerenal azotemia, overdiuresis. ? Cardiorenal syndrome?   Improving today.  No signs of obstruction on imaging.  - Avoid nephrotoxic drugs  - Nephro (Dr Goldsmith) consulted.
Suboptimal levels currently, now likely due to steroids.  - Hold oral hypoglycemic med: Metformin  - Insulin Corrective Scale with Accuchecks per routine, hypoglycemia protocol  - Consistent Carb Diet, Hemoglobin A1c is 6.5
Low Fat Diet

## 2020-05-22 NOTE — PROGRESS NOTE ADULT - SUBJECTIVE AND OBJECTIVE BOX
Patient is a 70y old  Female who presents with a chief complaint of acute hypoxemic respiratory failure (20 May 2020 06:35)  Patient seen in follow up for hyponatremia, MAURICIO.     Pt resting in bed. More awake now.       PAST MEDICAL HISTORY:  Hypertension  Type 2 diabetes mellitus  COPD with hypoxia  Pulmonary hypertension  History of Cholecystectomy  HTN (Hypertension)  Obstructive Sleep Apnea  COPD (Chronic Obstructive Pulmonary Disease)  Diabetes Mellitus Type II    MEDICATIONS  (STANDING):  albuterol/ipratropium for Nebulization 3 milliLiter(s) Nebulizer every 6 hours  cholecalciferol 1000 Unit(s) Oral daily  dextrose 5%. 1000 milliLiter(s) (50 mL/Hr) IV Continuous <Continuous>  dextrose 5%. 1000 milliLiter(s) (50 mL/Hr) IV Continuous <Continuous>  dextrose 50% Injectable 12.5 Gram(s) IV Push once  dextrose 50% Injectable 25 Gram(s) IV Push once  dextrose 50% Injectable 25 Gram(s) IV Push once  dextrose 50% Injectable 12.5 Gram(s) IV Push once  dextrose 50% Injectable 25 Gram(s) IV Push once  dextrose 50% Injectable 25 Gram(s) IV Push once  diltiazem    milliGRAM(s) Oral daily  ferrous    sulfate 325 milliGRAM(s) Oral daily  folic acid 1 milliGRAM(s) Oral daily  insulin lispro (HumaLOG) corrective regimen sliding scale   SubCutaneous at bedtime  insulin lispro (HumaLOG) corrective regimen sliding scale   SubCutaneous three times a day before meals  multivitamin 1 Tablet(s) Oral daily  pantoprazole  Injectable 40 milliGRAM(s) IV Push every 12 hours  predniSONE   Tablet 20 milliGRAM(s) Oral daily  sertraline 100 milliGRAM(s) Oral daily  sildenafil (REVATIO) 20 milliGRAM(s) Oral three times a day  torsemide 20 milliGRAM(s) Oral daily    MEDICATIONS  (PRN):  acetaminophen   Tablet .. 650 milliGRAM(s) Oral every 6 hours PRN Mild Pain (1 - 3), Moderate Pain (4 - 6)  dextrose 40% Gel 15 Gram(s) Oral once PRN Blood Glucose LESS THAN 70 milliGRAM(s)/deciliter  dextrose 40% Gel 15 Gram(s) Oral once PRN Blood Glucose LESS THAN 70 milliGRAM(s)/deciliter  glucagon  Injectable 1 milliGRAM(s) IntraMuscular once PRN Glucose LESS THAN 70 milligrams/deciliter  glucagon  Injectable 1 milliGRAM(s) IntraMuscular once PRN Glucose LESS THAN 70 milligrams/deciliter    T(C): 36.9 (05-22-20 @ 04:38), Max: 36.9 (05-22-20 @ 04:38)  HR: 89 (05-22-20 @ 07:54) (75 - 96)  BP: 105/74 (05-22-20 @ 04:38) (87/51 - 118/68)  RR: 20 (05-22-20 @ 04:38)  SpO2: 95% (05-22-20 @ 10:43)  Wt(kg): --  I&O's Detail    21 May 2020 07:01  -  22 May 2020 07:00  --------------------------------------------------------  IN:    Oral Fluid: 960 mL  Total IN: 960 mL    OUT:    Voided: 450 mL  Total OUT: 450 mL    Total NET: 510 mL        PHYSICAL EXAM:  General: No distress  Respiratory: b/l air entry  Cardiovascular: S1 S2  Gastrointestinal: soft  Extremities: edema             LABORATORY:                        8.8    9.45  )-----------( 243      ( 22 May 2020 08:40 )             28.6     05-22    131<L>  |  88<L>  |  83<H>  ----------------------------<  159<H>  3.9   |  33<H>  |  1.70<H>    Ca    10.7<H>      22 May 2020 08:40      Sodium, Serum: 131 mmol/L (05-22 @ 08:40)  Sodium, Serum: 127 mmol/L (05-21 @ 09:51)    Potassium, Serum: 3.9 mmol/L (05-22 @ 08:40)  Potassium, Serum: 4.2 mmol/L (05-21 @ 09:51)    Hemoglobin: 8.8 g/dL (05-22 @ 08:40)  Hemoglobin: 8.8 g/dL (05-21 @ 09:51)  Hemoglobin: 8.5 g/dL (05-20 @ 09:31)    Creatinine, Serum 1.70 (05-22 @ 08:40)  Creatinine, Serum 1.80 (05-21 @ 09:51)  Creatinine, Serum 1.80 (05-20 @ 09:31)

## 2020-05-22 NOTE — PROGRESS NOTE ADULT - PROBLEM SELECTOR PLAN 4
MAURICIO likely prerenal azotemia, overdiuresis. ? Cardiorenal syndrome?   Improving today.  No signs of obstruction on imaging.  - Avoid nephrotoxic drugs  - Nephro (Dr Goldsmith) consulted.

## 2020-05-22 NOTE — PROGRESS NOTE ADULT - ATTENDING COMMENTS
Dispo: Patient will not have access to house until Monday morning as her [place]. Discharge Monday 5/25 if she remains stable with diuretics reintroduced.
CODE STATUS: Patient is DNR/DNI. Daughter to email MOLST form. Order placed, and Palliative care nurse will be consulted to update MOLST.

## 2020-05-22 NOTE — PROGRESS NOTE ADULT - SUBJECTIVE AND OBJECTIVE BOX
Chief Complaint: Shortness of breath    Interval Events: No events overnight. No complaints.    Review of Systems:  General: No fevers, chills, weight loss or gain  Skin: No rashes, color changes  Cardiovascular: No chest pain, orthopnea  Respiratory: No shortness of breath, cough  Gastrointestinal: No nausea, abdominal pain  Genitourinary: No incontinence, pain with urination  Musculoskeletal: No pain, swelling, decreased range of motion  Neurological: No headache, weakness  Psychiatric: No depression, anxiety  Endocrine: No weight loss or gain, increased thirst  All other systems are comprehensively negative.    Physical Exam:  Vital Signs Last 24 Hrs  T(C): 36.9 (22 May 2020 04:38), Max: 36.9 (22 May 2020 04:38)  T(F): 98.4 (22 May 2020 04:38), Max: 98.4 (22 May 2020 04:38)  HR: 89 (22 May 2020 07:54) (79 - 96)  BP: 105/74 (22 May 2020 04:38) (104/61 - 118/68)  BP(mean): --  RR: 20 (22 May 2020 04:38) (18 - 20)  SpO2: 92% (22 May 2020 07:54) (92% - 96%)  General: NAD  HEENT: MMM  Neck: No JVD, no carotid bruit  Lungs: CTAB  CV: RRR, nl S1/S2, no M/R/G  Abdomen: S/NT/ND, +BS  Extremities: No LE edema, no cyanosis  Neuro: AAOx3, non-focal  Skin: No rash    Labs:             05-21    127<L>  |  85<L>  |  87<H>  ----------------------------<  167<H>  4.2   |  31  |  1.80<H>    Ca    10.4<H>      21 May 2020 09:51  Phos  4.6     05-20  Mg     2.4     05-20                          8.8    9.45  )-----------( 243      ( 22 May 2020 08:40 )             28.6       Telemetry: Sinus rhythm

## 2020-05-22 NOTE — PROGRESS NOTE ADULT - SUBJECTIVE AND OBJECTIVE BOX
ADMISSION HPI:  70 year old Female with PMHx of COPD (on home O2 via NC 5-6 L), chronic HFpEF (last documented TTE 5/2012), HTN, DM2, anxiety who presented with c/o SOB  and fatigue for four days. Patient also describes having a cough productive of white sputum, worsening lower extremity edema, left chest discomfort, and nausea. Every day her symptoms get worse, and are especially bad when she is trying to get around the house. Patient is on 5-6L O2 at home typically saturating in the low 90s. Daughter gave her nasal saline for breathing, which was not effective. With worsening SOB and LE edema, daughters called her cardiologist who prescribed a second diuretic metazolone along with torsemide 20 mg TID. Two days ago, daughter called her cardiologist, [place] patient was over diuresed and stopped her diuretics for the past two days. She denies fevers/chills, headache, vomiting, sick contacts.         Patient was transferred from [place] ED on 5/18 with the following vitals:   T: 97.4, HR 84, /67, RR: 18, SpO2 94% on non-rebreather.  CBC significant for Hgb 7.4 (in 2014 hgb 13.2), D-dimer <150. INR/PT/Ptt 1.26/14.1/27.3. CMP sig for Na 124-->125 s/p 1L NS at [place] ED, K 3.0 (3.0 in pl ED s/p 40KCl PO x1), Cl 81, CO2 37, BUN/Cr 96/1.93-->90/2.20 (in [facility] ED). Trops x 1 negative. Serum BNP 5421 5421. Lactate negative  CXR: The lungs are clear. The heart size is mildly enlarged. Multilevel degenerative changes are noted within the imaged potions of the spine.  EKG: NSR with RBBB per cardio note  Blood culture, CRP and Procalcitonin are in process  Meds given: tylenol 650, NS 1 liter bolus, 40KCl PO (18 May 2020 19:41)    INTERVAL HPI:  Patient seen and examined. No acute overnight events. Sleeping, but once awake answers questions appropriately.  She denies shortness of breath presently. Reports mild chest discomfort but says it is there frequently. Reports she had a small bowel movement yesterday evening, but remains constipated. . She agrees to a stool softener at this time.     REVIEW OF SYSTEMS:  As per HPI.    VITAL SIGNS:  Vital Signs Last 24 Hrs  T(C): 36.9 (22 May 2020 04:38), Max: 36.9 (22 May 2020 04:38)  T(F): 98.4 (22 May 2020 04:38), Max: 98.4 (22 May 2020 04:38)  HR: 89 (22 May 2020 07:54) (79 - 96)  BP: 105/74 (22 May 2020 04:38) (104/61 - 118/68)  BP(mean): --  RR: 20 (22 May 2020 04:38) (18 - 20)  SpO2: 95% (22 May 2020 10:43) (92% - 96%)    PHYSICAL EXAM:   GENERAL: obese elderly female, no acute distress  HEENT: NC/AT, EOMI, neck supple, MMM  RESPIRATORY: LCTAB/L, no rhonchi, rales, or wheezing  CARDIOVASCULAR: RRR, no murmurs, gallops, rubs  ABDOMINAL: soft, non-tender, non-distended, positive bowel sounds   EXTREMITIES: no clubbing, cyanosis, +1-2 pitting edema bilateral LEs  NEUROLOGICAL: somnolent, but rousable, non-focal  SKIN: warm, dry, intact  MUSCULOSKELETAL: no gross joint deformity                            8.8    9.45  )-----------( 243      ( 22 May 2020 08:40 )             28.6   05-22    131<L>  |  88<L>  |  83<H>  ----------------------------<  159<H>  3.9   |  33<H>  |  1.70<H>    Ca    10.7<H>      22 May 2020 08:40        CAPILLARY BLOOD GLUCOSE  POCT Blood Glucose.: 172 mg/dL (22 May 2020 07:35)  POCT Blood Glucose.: 225 mg/dL (21 May 2020 21:46)  POCT Blood Glucose.: 208 mg/dL (21 May 2020 16:54)  POCT Blood Glucose.: 214 mg/dL (21 May 2020 11:44)          MEDICATIONS  (STANDING):  albuterol/ipratropium for Nebulization 3 milliLiter(s) Nebulizer every 6 hours  cholecalciferol 1000 Unit(s) Oral daily  dextrose 5%. 1000 milliLiter(s) (50 mL/Hr) IV Continuous <Continuous>  dextrose 5%. 1000 milliLiter(s) (50 mL/Hr) IV Continuous <Continuous>  dextrose 50% Injectable 12.5 Gram(s) IV Push once  dextrose 50% Injectable 25 Gram(s) IV Push once  dextrose 50% Injectable 25 Gram(s) IV Push once  dextrose 50% Injectable 12.5 Gram(s) IV Push once  dextrose 50% Injectable 25 Gram(s) IV Push once  dextrose 50% Injectable 25 Gram(s) IV Push once  diltiazem    milliGRAM(s) Oral daily  ferrous    sulfate 325 milliGRAM(s) Oral daily  folic acid 1 milliGRAM(s) Oral daily  insulin lispro (HumaLOG) corrective regimen sliding scale   SubCutaneous at bedtime  insulin lispro (HumaLOG) corrective regimen sliding scale   SubCutaneous three times a day before meals  multivitamin 1 Tablet(s) Oral daily  pantoprazole  Injectable 40 milliGRAM(s) IV Push every 12 hours  predniSONE   Tablet 20 milliGRAM(s) Oral daily  sertraline 100 milliGRAM(s) Oral daily  sildenafil (REVATIO) 20 milliGRAM(s) Oral three times a day  torsemide 20 milliGRAM(s) Oral daily    MEDICATIONS  (PRN):  acetaminophen   Tablet .. 650 milliGRAM(s) Oral every 6 hours PRN Mild Pain (1 - 3), Moderate Pain (4 - 6)  dextrose 40% Gel 15 Gram(s) Oral once PRN Blood Glucose LESS THAN 70 milliGRAM(s)/deciliter  dextrose 40% Gel 15 Gram(s) Oral once PRN Blood Glucose LESS THAN 70 milliGRAM(s)/deciliter  glucagon  Injectable 1 milliGRAM(s) IntraMuscular once PRN Glucose LESS THAN 70 milligrams/deciliter  glucagon  Injectable 1 milliGRAM(s) IntraMuscular once PRN Glucose LESS THAN 70 milligrams/deciliter

## 2020-05-22 NOTE — PROGRESS NOTE ADULT - SUBJECTIVE AND OBJECTIVE BOX
Date/Time Patient Seen:  		  Referring MD:   Data Reviewed	       Patient is a 70y old  Female who presents with a chief complaint of acute hypoxemic respiratory failure (21 May 2020 14:01)      Subjective/HPI     PAST MEDICAL & SURGICAL HISTORY:  Hypertension  Type 2 diabetes mellitus  COPD with hypoxia  Pulmonary hypertension  History of Cholecystectomy  HTN (Hypertension)  Obstructive Sleep Apnea  COPD (Chronic Obstructive Pulmonary Disease)  Diabetes Mellitus Type II  S/P tubal ligation  S/P Cholecystectomy        Medication list         MEDICATIONS  (STANDING):  albuterol/ipratropium for Nebulization 3 milliLiter(s) Nebulizer every 6 hours  cholecalciferol 1000 Unit(s) Oral daily  dextrose 5%. 1000 milliLiter(s) (50 mL/Hr) IV Continuous <Continuous>  dextrose 5%. 1000 milliLiter(s) (50 mL/Hr) IV Continuous <Continuous>  dextrose 50% Injectable 12.5 Gram(s) IV Push once  dextrose 50% Injectable 25 Gram(s) IV Push once  dextrose 50% Injectable 25 Gram(s) IV Push once  dextrose 50% Injectable 12.5 Gram(s) IV Push once  dextrose 50% Injectable 25 Gram(s) IV Push once  dextrose 50% Injectable 25 Gram(s) IV Push once  diltiazem    milliGRAM(s) Oral daily  ferrous    sulfate 325 milliGRAM(s) Oral daily  folic acid 1 milliGRAM(s) Oral daily  insulin lispro (HumaLOG) corrective regimen sliding scale   SubCutaneous at bedtime  insulin lispro (HumaLOG) corrective regimen sliding scale   SubCutaneous three times a day before meals  methylPREDNISolone sodium succinate Injectable 40 milliGRAM(s) IV Push daily  multivitamin 1 Tablet(s) Oral daily  pantoprazole  Injectable 40 milliGRAM(s) IV Push every 12 hours  sertraline 100 milliGRAM(s) Oral daily  sildenafil (REVATIO) 20 milliGRAM(s) Oral three times a day    MEDICATIONS  (PRN):  acetaminophen   Tablet .. 650 milliGRAM(s) Oral every 6 hours PRN Mild Pain (1 - 3), Moderate Pain (4 - 6)  dextrose 40% Gel 15 Gram(s) Oral once PRN Blood Glucose LESS THAN 70 milliGRAM(s)/deciliter  dextrose 40% Gel 15 Gram(s) Oral once PRN Blood Glucose LESS THAN 70 milliGRAM(s)/deciliter  glucagon  Injectable 1 milliGRAM(s) IntraMuscular once PRN Glucose LESS THAN 70 milligrams/deciliter  glucagon  Injectable 1 milliGRAM(s) IntraMuscular once PRN Glucose LESS THAN 70 milligrams/deciliter         Vitals log        ICU Vital Signs Last 24 Hrs  T(C): 36.9 (22 May 2020 04:38), Max: 36.9 (22 May 2020 04:38)  T(F): 98.4 (22 May 2020 04:38), Max: 98.4 (22 May 2020 04:38)  HR: 88 (22 May 2020 04:38) (79 - 96)  BP: 105/74 (22 May 2020 04:38) (104/61 - 118/68)  BP(mean): --  ABP: --  ABP(mean): --  RR: 20 (22 May 2020 04:38) (18 - 20)  SpO2: 93% (22 May 2020 04:38) (93% - 96%)           Input and Output:  I&O's Detail    21 May 2020 07:01  -  22 May 2020 06:30  --------------------------------------------------------  IN:    Oral Fluid: 960 mL  Total IN: 960 mL    OUT:    Voided: 450 mL  Total OUT: 450 mL    Total NET: 510 mL          Lab Data                        8.8    11.29 )-----------( 257      ( 21 May 2020 09:51 )             28.0     05-21    127<L>  |  85<L>  |  87<H>  ----------------------------<  167<H>  4.2   |  31  |  1.80<H>    Ca    10.4<H>      21 May 2020 09:51  Phos  4.6     05-20  Mg     2.4     05-20              Review of Systems	      Objective     Physical Examination    heart s1s2  lung dec BS  abd soft      Pertinent Lab findings & Imaging      Billy:  NO   Adequate UO     I&O's Detail    21 May 2020 07:01  -  22 May 2020 06:30  --------------------------------------------------------  IN:    Oral Fluid: 960 mL  Total IN: 960 mL    OUT:    Voided: 450 mL  Total OUT: 450 mL    Total NET: 510 mL               Discussed with:     Cultures:	        Radiology

## 2020-05-22 NOTE — DISCHARGE NOTE NURSING/CASE MANAGEMENT/SOCIAL WORK - PATIENT PORTAL LINK FT
You can access the FollowMyHealth Patient Portal offered by [facility] by registering at the following website: [email or web address]. By joining [facility]’s FollowMyHealth portal, you will also be able to view your health information using other applications (apps) compatible with our system.

## 2020-05-22 NOTE — PROGRESS NOTE ADULT - ASSESSMENT
1.	Hyponatremia: Likely multifactorial. Diuretic therapy, Increased ADH State  2.	MAURICIO, CKD: CRS  3.	Diabetes  4.	Dyspnea: Multifactorial: COPD, Pulmonary hypertension  5.	Anemia  6.	Hypokalemia    Stable renal indices. Improving sodium levels. Resumed on PO torsemide. PO fluid restriction.   Rx for COPD. Monitor blood sugar levels. Insulin coverage as needed. Dietary restriction. PRN Potassium supps.   Will follow electrolytes and renal function trend. D/c planning. 1.	Hyponatremia: Likely multifactorial. Diuretic therapy, Increased ADH State  2.	MAURICIO, CKD: CRS  3.	Diabetes  4.	Dyspnea: Multifactorial: COPD, Pulmonary hypertension  5.	Anemia  6.	Hypokalemia    Stable renal indices. Improving sodium levels. Resumed on PO torsemide. PO fluid restriction. D/c Vitamin D with elevated calcium levels.   Rx for COPD. Monitor blood sugar levels. Insulin coverage as needed. Dietary restriction. PRN Potassium supps.   Will follow electrolytes and renal function trend. D/c planning.

## 2020-05-22 NOTE — PROGRESS NOTE ADULT - PROBLEM SELECTOR PLAN 3
Persistent hyponatremia likely due to SIADH/diuresis. improved with fluid restriction and sodium tabs.  -Now up to 131.  -Monitor chemistries  - Nephro (Dr Goldsmith) following;.

## 2020-05-22 NOTE — PROGRESS NOTE ADULT - PROBLEM SELECTOR PLAN 5
Chronic HFpEF, with ?pulm HTN found on CT  - Continue to hold diuresis today for hyponatremia. Monitor volume status.   - Continue Sildenafil  - Continue Cardizem  - Restart Torsemide today.  - Hold metolazone and spironolactone per Dr Staton  - Last documented TTE 5/2012 on our records  -Echo reviewed.  - Cardio (Dr Staton) consulted, recs appreciated

## 2020-05-22 NOTE — PROGRESS NOTE ADULT - ASSESSMENT
The patient is a 70 year old female with a history of HTN, DM, chronic diastolic heart failure, COPD, pulmonary hypertension who presents with shortness of breath, likely multifactorial from COPD, chronic diastolic heart failure.    Plan:  - Echo with right-sided heart failure, severe TR and severe pulm HTN  - BNP elevated at 5421  - CXR with clear lungs  - COVID-19 negative  - Will discuss with renal regarding resuming torsemide and spironolactone  - Hold metolazone  - Continue sildenafil 20 mg tid  - Continue diltiazem 120 mg daily  - Pulm follow-up  - Patient to discuss with her outpatient physicians regarding evaluation at tertiary center with a pulmonary hypertension specialist The patient is a 70 year old female with a history of HTN, DM, chronic diastolic heart failure, COPD, pulmonary hypertension who presents with shortness of breath, likely multifactorial from COPD, chronic diastolic heart failure.    Plan:  - Echo with right-sided heart failure, severe TR and severe pulm HTN  - BNP elevated at 5421  - CXR with clear lungs  - COVID-19 negative  - Discussed with renal - will restart torsemide at 20 mg daily  - Hold spironolactone  - Hold metolazone  - Continue sildenafil 20 mg tid  - Continue diltiazem 120 mg daily  - Pulm follow-up  - Patient to discuss with her outpatient physicians regarding evaluation at tertiary center with a pulmonary hypertension specialist

## 2020-05-23 LAB
ANION GAP SERPL CALC-SCNC: 7 MMOL/L — SIGNIFICANT CHANGE UP (ref 5–17)
BUN SERPL-MCNC: 73 MG/DL — HIGH (ref 7–23)
CALCIUM SERPL-MCNC: 10.1 MG/DL — SIGNIFICANT CHANGE UP (ref 8.5–10.1)
CHLORIDE SERPL-SCNC: 93 MMOL/L — LOW (ref 96–108)
CO2 SERPL-SCNC: 32 MMOL/L — HIGH (ref 22–31)
CREAT SERPL-MCNC: 1.5 MG/DL — HIGH (ref 0.5–1.3)
CULTURE RESULTS: SIGNIFICANT CHANGE UP
CULTURE RESULTS: SIGNIFICANT CHANGE UP
GLUCOSE SERPL-MCNC: 158 MG/DL — HIGH (ref 70–99)
HCT VFR BLD CALC: 28.2 % — LOW (ref 34.5–45)
HGB BLD-MCNC: 8.7 G/DL — LOW (ref 11.5–15.5)
MAGNESIUM SERPL-MCNC: 2.5 MG/DL — SIGNIFICANT CHANGE UP (ref 1.6–2.6)
MCHC RBC-ENTMCNC: 25 PG — LOW (ref 27–34)
MCHC RBC-ENTMCNC: 30.9 GM/DL — LOW (ref 32–36)
MCV RBC AUTO: 81 FL — SIGNIFICANT CHANGE UP (ref 80–100)
NRBC # BLD: 0 /100 WBCS — SIGNIFICANT CHANGE UP (ref 0–0)
OB PNL STL: POSITIVE
PHOSPHATE SERPL-MCNC: 2.8 MG/DL — SIGNIFICANT CHANGE UP (ref 2.5–4.5)
PLATELET # BLD AUTO: 250 K/UL — SIGNIFICANT CHANGE UP (ref 150–400)
POTASSIUM SERPL-MCNC: 3.7 MMOL/L — SIGNIFICANT CHANGE UP (ref 3.5–5.3)
POTASSIUM SERPL-SCNC: 3.7 MMOL/L — SIGNIFICANT CHANGE UP (ref 3.5–5.3)
RBC # BLD: 3.48 M/UL — LOW (ref 3.8–5.2)
RBC # FLD: 15.3 % — HIGH (ref 10.3–14.5)
SODIUM SERPL-SCNC: 132 MMOL/L — LOW (ref 135–145)
SPECIMEN SOURCE: SIGNIFICANT CHANGE UP
SPECIMEN SOURCE: SIGNIFICANT CHANGE UP
WBC # BLD: 12.96 K/UL — HIGH (ref 3.8–10.5)
WBC # FLD AUTO: 12.96 K/UL — HIGH (ref 3.8–10.5)

## 2020-05-23 PROCEDURE — 99233 SBSQ HOSP IP/OBS HIGH 50: CPT

## 2020-05-23 PROCEDURE — 74018 RADEX ABDOMEN 1 VIEW: CPT | Mod: 26

## 2020-05-23 PROCEDURE — 74018 RADEX ABDOMEN 1 VIEW: CPT | Mod: 26,77

## 2020-05-23 RX ORDER — LANOLIN ALCOHOL/MO/W.PET/CERES
5 CREAM (GRAM) TOPICAL ONCE
Refills: 0 | Status: COMPLETED | OUTPATIENT
Start: 2020-05-23 | End: 2020-05-23

## 2020-05-23 RX ORDER — SENNA PLUS 8.6 MG/1
2 TABLET ORAL AT BEDTIME
Refills: 0 | Status: DISCONTINUED | OUTPATIENT
Start: 2020-05-23 | End: 2020-05-25

## 2020-05-23 RX ORDER — MAGNESIUM HYDROXIDE 400 MG/1
30 TABLET, CHEWABLE ORAL DAILY
Refills: 0 | Status: DISCONTINUED | OUTPATIENT
Start: 2020-05-23 | End: 2020-05-25

## 2020-05-23 RX ADMIN — Medication 10 MILLIGRAM(S): at 00:53

## 2020-05-23 RX ADMIN — PANTOPRAZOLE SODIUM 40 MILLIGRAM(S): 20 TABLET, DELAYED RELEASE ORAL at 17:06

## 2020-05-23 RX ADMIN — Medication 2: at 12:14

## 2020-05-23 RX ADMIN — Medication 3 MILLILITER(S): at 08:43

## 2020-05-23 RX ADMIN — Medication 1 MILLIGRAM(S): at 11:03

## 2020-05-23 RX ADMIN — Medication 1: at 08:06

## 2020-05-23 RX ADMIN — Medication 3 MILLILITER(S): at 19:39

## 2020-05-23 RX ADMIN — Medication 5 MILLIGRAM(S): at 00:53

## 2020-05-23 RX ADMIN — Medication 325 MILLIGRAM(S): at 11:03

## 2020-05-23 RX ADMIN — Medication 1 ENEMA: at 13:02

## 2020-05-23 RX ADMIN — Medication 3 MILLILITER(S): at 14:21

## 2020-05-23 RX ADMIN — PANTOPRAZOLE SODIUM 40 MILLIGRAM(S): 20 TABLET, DELAYED RELEASE ORAL at 06:03

## 2020-05-23 RX ADMIN — Medication 1 TABLET(S): at 11:03

## 2020-05-23 RX ADMIN — Medication 20 MILLIGRAM(S): at 11:02

## 2020-05-23 RX ADMIN — Medication 20 MILLIGRAM(S): at 21:10

## 2020-05-23 RX ADMIN — SENNA PLUS 2 TABLET(S): 8.6 TABLET ORAL at 21:10

## 2020-05-23 RX ADMIN — Medication 20 MILLIGRAM(S): at 06:02

## 2020-05-23 RX ADMIN — Medication 1: at 17:05

## 2020-05-23 RX ADMIN — SERTRALINE 100 MILLIGRAM(S): 25 TABLET, FILM COATED ORAL at 11:03

## 2020-05-23 RX ADMIN — Medication 650 MILLIGRAM(S): at 21:10

## 2020-05-23 RX ADMIN — Medication 20 MILLIGRAM(S): at 06:03

## 2020-05-23 NOTE — CHART NOTE - NSCHARTNOTEFT_GEN_A_CORE
Called by RN; patient complaining of 10/10 abdominal pain. Patient seen and evaluated at bedside. Patient states she has been feeling abdominal discomfort over the past two days; although she states that the pain has worsened throughout the day despite being given senna and Miralax. She states that pain is located in her mid-abdomen. She admits to passing gas without difficulty. She reports that the last time she had a bowel movement was 2 days ago. She states she has been eating food regularly and takes her ice chips as she is fluid restricted. Condom catheter in place has been draining yellow urine. Denies nausea/vomiting.  Denies any other complaints.     T(C): 36.8 (05-22-20 @ 20:50), Max: 36.9 (05-22-20 @ 04:38)  HR: 80 (05-22-20 @ 20:50) (79 - 91)  BP: 118/65 (05-22-20 @ 20:50) (105/74 - 121/73)  RR: 19 (05-22-20 @ 20:50) (19 - 20)  SpO2: 91% (05-22-20 @ 20:50) (91% - 96%)    GENERAL: obese elderly female, no acute distress   LUNGS: good air entry bilaterally, clear to auscultation, symmetric breath sounds, no wheezing or rhonchi appreciated  HEART: soft S1/S2, regular rate and rhythm, no murmurs noted, no lower extremity edema  GASTROINTESTINAL: abdomen is soft, mild tenderness to palpation to upper abdomen, upper abdomen is distended, lower abdomen nondistended, normoactive bowel sounds, no palpable masses  NEUROLOGIC: awake, alert, oriented x3    A/P   70 year old Female with PMHx of COPD (on home O2 via NC 5-6 L), chronic HFpEF (last documented TTE 5/2012), HTN, DM2, Mood disorder who presented with c/o SOB x3 days, +non productive cough and chronic lower extremity edema, admitted for acute hypoxemic respiratory failure likely multifactorial, due to mild COPD exacerbation, possible acute diastolic heart failure, complicated by MAURICIO, hyponatremia. Patient now complaining of abdominal pain, 10/10 in severity, and difficulty with bowel movements.   -Patient seen and examined, patient is moderately distended in her upper abdomen, mildly tender to palpation. Passing gas without difficulty   -Ordered upright abdominal XRay given degree of abdominal distension   -S/p senna and miralax, will order Dulcolax suppository and enema   -Will continue to monitor; RN to call if any changes

## 2020-05-23 NOTE — PROGRESS NOTE ADULT - SUBJECTIVE AND OBJECTIVE BOX
Chief Complaint: Shortness of breath    Interval Events: Abdominal pain overnight.    Review of Systems:  General: No fevers, chills, weight loss or gain  Skin: No rashes, color changes  Cardiovascular: No chest pain, orthopnea  Respiratory: No shortness of breath, cough  Gastrointestinal: No nausea, abdominal pain  Genitourinary: No incontinence, pain with urination  Musculoskeletal: No pain, swelling, decreased range of motion  Neurological: No headache, weakness  Psychiatric: No depression, anxiety  Endocrine: No weight loss or gain, increased thirst  All other systems are comprehensively negative.    Physical Exam:  Vital Signs Last 24 Hrs  T(C): 36.4 (23 May 2020 05:34), Max: 36.8 (22 May 2020 20:50)  T(F): 97.6 (23 May 2020 05:34), Max: 98.2 (22 May 2020 20:50)  HR: 75 (23 May 2020 07:47) (75 - 87)  BP: 103/67 (23 May 2020 05:34) (103/67 - 121/73)  BP(mean): --  RR: 19 (23 May 2020 05:34) (19 - 20)  SpO2: 94% (23 May 2020 07:47) (91% - 95%)  General: NAD  HEENT: MMM  Neck: No JVD, no carotid bruit  Lungs: CTAB  CV: RRR, nl S1/S2, no M/R/G  Abdomen: S/NT/ND, +BS  Extremities: 2+ LE edema, no cyanosis  Neuro: AAOx3, non-focal  Skin: No rash    Labs:             05-23    132<L>  |  93<L>  |  73<H>  ----------------------------<  158<H>  3.7   |  32<H>  |  1.50<H>    Ca    10.1      23 May 2020 08:22  Phos  2.8     05-23  Mg     2.5     05-23                          8.7    12.96 )-----------( 250      ( 23 May 2020 08:22 )             28.2     Telemetry: Sinus rhythm

## 2020-05-23 NOTE — PROGRESS NOTE ADULT - SUBJECTIVE AND OBJECTIVE BOX
VIV SOLITARIORA  70y  Female    Patient is a 70y old  Female who presents with a chief complaint of acute hypoxemic respiratory failure (23 May 2020 09:21)    lethargic but arousable.       PAST MEDICAL & SURGICAL HISTORY:  Hypertension  Type 2 diabetes mellitus  COPD with hypoxia  Pulmonary hypertension  History of Cholecystectomy  HTN (Hypertension)  Obstructive Sleep Apnea  COPD (Chronic Obstructive Pulmonary Disease)  Diabetes Mellitus Type II  S/P tubal ligation  S/P Cholecystectomy          PHYSICAL EXAM:    T(C): 36.7 (05-23-20 @ 14:20), Max: 36.8 (05-22-20 @ 20:50)  HR: 82 (05-23-20 @ 14:20) (75 - 87)  BP: 127/63 (05-23-20 @ 14:20) (103/67 - 127/63)  RR: 20 (05-23-20 @ 14:20) (19 - 20)  SpO2: 94% (05-23-20 @ 14:20) (91% - 94%)  Wt(kg): --    I&O's Detail    23 May 2020 07:01  -  23 May 2020 15:27  --------------------------------------------------------  IN:    Oral Fluid: 180 mL  Total IN: 180 mL    OUT:  Total OUT: 0 mL    Total NET: 180 mL          Respiratory: clear anteriorly, decreased BS at bases  Cardiovascular: S1 S2  Gastrointestinal: soft NT ND +BS  Extremities: edema   Neuro: Awake and alert    MEDICATIONS  (STANDING):  albuterol/ipratropium for Nebulization 3 milliLiter(s) Nebulizer every 6 hours  dextrose 5%. 1000 milliLiter(s) (50 mL/Hr) IV Continuous <Continuous>  dextrose 5%. 1000 milliLiter(s) (50 mL/Hr) IV Continuous <Continuous>  dextrose 50% Injectable 12.5 Gram(s) IV Push once  dextrose 50% Injectable 25 Gram(s) IV Push once  dextrose 50% Injectable 25 Gram(s) IV Push once  dextrose 50% Injectable 12.5 Gram(s) IV Push once  dextrose 50% Injectable 25 Gram(s) IV Push once  dextrose 50% Injectable 25 Gram(s) IV Push once  diltiazem    milliGRAM(s) Oral daily  ferrous    sulfate 325 milliGRAM(s) Oral daily  folic acid 1 milliGRAM(s) Oral daily  insulin lispro (HumaLOG) corrective regimen sliding scale   SubCutaneous at bedtime  insulin lispro (HumaLOG) corrective regimen sliding scale   SubCutaneous three times a day before meals  multivitamin 1 Tablet(s) Oral daily  pantoprazole  Injectable 40 milliGRAM(s) IV Push every 12 hours  predniSONE   Tablet 20 milliGRAM(s) Oral daily  senna 2 Tablet(s) Oral at bedtime  sertraline 100 milliGRAM(s) Oral daily  sildenafil (REVATIO) 20 milliGRAM(s) Oral three times a day  torsemide 20 milliGRAM(s) Oral daily    MEDICATIONS  (PRN):  acetaminophen   Tablet .. 650 milliGRAM(s) Oral every 6 hours PRN Mild Pain (1 - 3), Moderate Pain (4 - 6)  bisacodyl 5 milliGRAM(s) Oral every 12 hours PRN Constipation  dextrose 40% Gel 15 Gram(s) Oral once PRN Blood Glucose LESS THAN 70 milliGRAM(s)/deciliter  dextrose 40% Gel 15 Gram(s) Oral once PRN Blood Glucose LESS THAN 70 milliGRAM(s)/deciliter  glucagon  Injectable 1 milliGRAM(s) IntraMuscular once PRN Glucose LESS THAN 70 milligrams/deciliter  glucagon  Injectable 1 milliGRAM(s) IntraMuscular once PRN Glucose LESS THAN 70 milligrams/deciliter  magnesium hydroxide Suspension 30 milliLiter(s) Oral daily PRN Constipation                            8.7    12.96 )-----------( 250      ( 23 May 2020 08:22 )             28.2       05-23    132<L>  |  93<L>  |  73<H>  ----------------------------<  158<H>  3.7   |  32<H>  |  1.50<H>    Ca    10.1      23 May 2020 08:22  Phos  2.8     05-23  Mg     2.5     05-23        Creatinine Trend: Creatinine Trend: 1.50<--, 1.70<--, 1.80<--, 1.80<--, 2.00<--, 2.20<--

## 2020-05-23 NOTE — PROGRESS NOTE ADULT - SUBJECTIVE AND OBJECTIVE BOX
Date/Time Patient Seen:  		  Referring MD:   Data Reviewed	       Patient is a 70y old  Female who presents with a chief complaint of acute hypoxemic respiratory failure (22 May 2020 12:29)      Subjective/HPI     PAST MEDICAL & SURGICAL HISTORY:  Hypertension  Type 2 diabetes mellitus  COPD with hypoxia  Pulmonary hypertension  History of Cholecystectomy  HTN (Hypertension)  Obstructive Sleep Apnea  COPD (Chronic Obstructive Pulmonary Disease)  Diabetes Mellitus Type II  S/P tubal ligation  S/P Cholecystectomy        Medication list         MEDICATIONS  (STANDING):  albuterol/ipratropium for Nebulization 3 milliLiter(s) Nebulizer every 6 hours  dextrose 5%. 1000 milliLiter(s) (50 mL/Hr) IV Continuous <Continuous>  dextrose 5%. 1000 milliLiter(s) (50 mL/Hr) IV Continuous <Continuous>  dextrose 50% Injectable 12.5 Gram(s) IV Push once  dextrose 50% Injectable 25 Gram(s) IV Push once  dextrose 50% Injectable 25 Gram(s) IV Push once  dextrose 50% Injectable 12.5 Gram(s) IV Push once  dextrose 50% Injectable 25 Gram(s) IV Push once  dextrose 50% Injectable 25 Gram(s) IV Push once  diltiazem    milliGRAM(s) Oral daily  ferrous    sulfate 325 milliGRAM(s) Oral daily  folic acid 1 milliGRAM(s) Oral daily  insulin lispro (HumaLOG) corrective regimen sliding scale   SubCutaneous at bedtime  insulin lispro (HumaLOG) corrective regimen sliding scale   SubCutaneous three times a day before meals  multivitamin 1 Tablet(s) Oral daily  pantoprazole  Injectable 40 milliGRAM(s) IV Push every 12 hours  predniSONE   Tablet 20 milliGRAM(s) Oral daily  sertraline 100 milliGRAM(s) Oral daily  sildenafil (REVATIO) 20 milliGRAM(s) Oral three times a day  torsemide 20 milliGRAM(s) Oral daily    MEDICATIONS  (PRN):  acetaminophen   Tablet .. 650 milliGRAM(s) Oral every 6 hours PRN Mild Pain (1 - 3), Moderate Pain (4 - 6)  dextrose 40% Gel 15 Gram(s) Oral once PRN Blood Glucose LESS THAN 70 milliGRAM(s)/deciliter  dextrose 40% Gel 15 Gram(s) Oral once PRN Blood Glucose LESS THAN 70 milliGRAM(s)/deciliter  glucagon  Injectable 1 milliGRAM(s) IntraMuscular once PRN Glucose LESS THAN 70 milligrams/deciliter  glucagon  Injectable 1 milliGRAM(s) IntraMuscular once PRN Glucose LESS THAN 70 milligrams/deciliter         Vitals log        ICU Vital Signs Last 24 Hrs  T(C): 36.4 (23 May 2020 05:34), Max: 36.8 (22 May 2020 20:50)  T(F): 97.6 (23 May 2020 05:34), Max: 98.2 (22 May 2020 20:50)  HR: 87 (23 May 2020 05:34) (80 - 91)  BP: 103/67 (23 May 2020 05:34) (103/67 - 121/73)  BP(mean): --  ABP: --  ABP(mean): --  RR: 19 (23 May 2020 05:34) (19 - 20)  SpO2: 94% (23 May 2020 05:34) (91% - 95%)           Input and Output:  I&O's Detail    21 May 2020 07:01  -  22 May 2020 07:00  --------------------------------------------------------  IN:    Oral Fluid: 960 mL  Total IN: 960 mL    OUT:    Voided: 450 mL  Total OUT: 450 mL    Total NET: 510 mL          Lab Data                        8.8    9.45  )-----------( 243      ( 22 May 2020 08:40 )             28.6     05-22    131<L>  |  88<L>  |  83<H>  ----------------------------<  159<H>  3.9   |  33<H>  |  1.70<H>    Ca    10.7<H>      22 May 2020 08:40              Review of Systems	      Objective     Physical Examination    heart s1s2  lung dec BS  abd obese      Pertinent Lab findings & Imaging      Billy:  NO   Adequate UO     I&O's Detail    21 May 2020 07:01  -  22 May 2020 07:00  --------------------------------------------------------  IN:    Oral Fluid: 960 mL  Total IN: 960 mL    OUT:    Voided: 450 mL  Total OUT: 450 mL    Total NET: 510 mL               Discussed with:     Cultures:	        Radiology

## 2020-05-23 NOTE — PROGRESS NOTE ADULT - SUBJECTIVE AND OBJECTIVE BOX
CHIEF COMPLAINT/INTERVAL HISTORY:  Pt. seen and evaluated for acute hypoxic respiratory failure 2/2 COPD exacerbation and pulmonary htn.  Pt. reports respiratory status is improving.  Stated she had severe abdominal pain last night.  Currently still with some abdominal discomfort.  Pt. reported having small BM two days ago.   Tolerating prednisone.      REVIEW OF SYSTEMS:  No fever, CP, or acute respiratory distress.      Vital Signs Last 24 Hrs  T(C): 36.4 (23 May 2020 05:34), Max: 36.8 (22 May 2020 20:50)  T(F): 97.6 (23 May 2020 05:34), Max: 98.2 (22 May 2020 20:50)  HR: 75 (23 May 2020 07:47) (75 - 87)  BP: 103/67 (23 May 2020 05:34) (103/67 - 121/73)  BP(mean): --  RR: 19 (23 May 2020 05:34) (19 - 20)  SpO2: 94% (23 May 2020 07:47) (91% - 95%)    PHYSICAL EXAM:  GENERAL: mild distress 2/2 abdominal pain  HEENT: EOMI, hearing normal, conjunctiva and sclera clear  Chest: Diminished BS bilaterally, no wheezing  CV: S1S2, RRR,   GI: soft, +BS, generalized abdominal pain.  No rebound or guarding.   Musculoskeletal: 1+ LE edema  Psychiatric: affect nL, mood nL  Skin: warm and dry    LABS:                        8.7    12.96 )-----------( 250      ( 23 May 2020 08:22 )             28.2     05-23    132<L>  |  93<L>  |  73<H>  ----------------------------<  158<H>  3.7   |  32<H>  |  1.50<H>    Ca    10.1      23 May 2020 08:22  Phos  2.8     05-23  Mg     2.5     05-23      Assessment and Plan:  -Acute hypoxic respiratory failure 2/2 COPD exacerbation and pulmonary HTN:  continue Prednisone 20mg PO daily, Duoneb tx Q6h, and Revatio 20mg PO TID.  Continue O2 support via nasal canula.  Pulmonary f/u  -Abdominal pain/Constipation:  Abdominal x-ray with Mild diffuse air-filled bowel ileus with moderate amount of stool within the rectum.  Continue Senna 2tabs PO QHS, Dulcolax 5mg PO Q12h PRN, and Magnesium hydroxide 30mL PO daily PRN.  Will give Fleet enema once.    -Anemia:  stable.  Continue ferrous sulfate 325mg PO daily  -Hyponatremia and MAURICIO:  improved.  Monitor while on diuretic.  Nephrology f/u    -HFpEF:  continue Torsemide 20mg PO daily.  Holding metolazone and spironolactone 2/2 MAURICIO.  Cardiology f/u  -Type 2 DM:  continue humalog sliding scale  -Hx of arrhythmia:  continue Cardizem CD 120mg PO daily  -VTE ppx:  SCD

## 2020-05-23 NOTE — PROGRESS NOTE ADULT - ASSESSMENT
70 year old Female with COPD (on home O2 via NC 5-6 L), chronic HFpEF (last documented TTE 5/2012), HTN, DM2, anxiety who presented with c/o SOB  and fatigue. 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 Will limit fluid intake to 1000 cc a day and continue the small dose of Torsemide.

## 2020-05-24 ENCOUNTER — TRANSCRIPTION ENCOUNTER (OUTPATIENT)
Age: 70
End: 2020-05-24

## 2020-05-24 LAB
ANION GAP SERPL CALC-SCNC: 8 MMOL/L — SIGNIFICANT CHANGE UP (ref 5–17)
ANION GAP SERPL CALC-SCNC: 8 MMOL/L — SIGNIFICANT CHANGE UP (ref 5–17)
BUN SERPL-MCNC: 69 MG/DL — HIGH (ref 7–23)
BUN SERPL-MCNC: 70 MG/DL — HIGH (ref 7–23)
CALCIUM SERPL-MCNC: 10.1 MG/DL — SIGNIFICANT CHANGE UP (ref 8.5–10.1)
CALCIUM SERPL-MCNC: 9.9 MG/DL — SIGNIFICANT CHANGE UP (ref 8.5–10.1)
CHLORIDE SERPL-SCNC: 91 MMOL/L — LOW (ref 96–108)
CHLORIDE SERPL-SCNC: 93 MMOL/L — LOW (ref 96–108)
CO2 SERPL-SCNC: 31 MMOL/L — SIGNIFICANT CHANGE UP (ref 22–31)
CO2 SERPL-SCNC: 35 MMOL/L — HIGH (ref 22–31)
CREAT SERPL-MCNC: 1.4 MG/DL — HIGH (ref 0.5–1.3)
CREAT SERPL-MCNC: 1.5 MG/DL — HIGH (ref 0.5–1.3)
GLUCOSE SERPL-MCNC: 140 MG/DL — HIGH (ref 70–99)
GLUCOSE SERPL-MCNC: 164 MG/DL — HIGH (ref 70–99)
HCT VFR BLD CALC: 30.5 % — LOW (ref 34.5–45)
HGB BLD-MCNC: 9.5 G/DL — LOW (ref 11.5–15.5)
MAGNESIUM SERPL-MCNC: 2 MG/DL — SIGNIFICANT CHANGE UP (ref 1.6–2.6)
MAGNESIUM SERPL-MCNC: 2.1 MG/DL — SIGNIFICANT CHANGE UP (ref 1.6–2.6)
MCHC RBC-ENTMCNC: 25.5 PG — LOW (ref 27–34)
MCHC RBC-ENTMCNC: 31.1 GM/DL — LOW (ref 32–36)
MCV RBC AUTO: 82 FL — SIGNIFICANT CHANGE UP (ref 80–100)
NRBC # BLD: 0 /100 WBCS — SIGNIFICANT CHANGE UP (ref 0–0)
PHOSPHATE SERPL-MCNC: 3 MG/DL — SIGNIFICANT CHANGE UP (ref 2.5–4.5)
PLATELET # BLD AUTO: 247 K/UL — SIGNIFICANT CHANGE UP (ref 150–400)
POTASSIUM SERPL-MCNC: 3.2 MMOL/L — LOW (ref 3.5–5.3)
POTASSIUM SERPL-MCNC: 4.5 MMOL/L — SIGNIFICANT CHANGE UP (ref 3.5–5.3)
POTASSIUM SERPL-SCNC: 3.2 MMOL/L — LOW (ref 3.5–5.3)
POTASSIUM SERPL-SCNC: 4.5 MMOL/L — SIGNIFICANT CHANGE UP (ref 3.5–5.3)
RBC # BLD: 3.72 M/UL — LOW (ref 3.8–5.2)
RBC # FLD: 14.8 % — HIGH (ref 10.3–14.5)
SODIUM SERPL-SCNC: 132 MMOL/L — LOW (ref 135–145)
SODIUM SERPL-SCNC: 134 MMOL/L — LOW (ref 135–145)
WBC # BLD: 10.67 K/UL — HIGH (ref 3.8–10.5)
WBC # FLD AUTO: 10.67 K/UL — HIGH (ref 3.8–10.5)

## 2020-05-24 PROCEDURE — 99233 SBSQ HOSP IP/OBS HIGH 50: CPT

## 2020-05-24 RX ORDER — POLYETHYLENE GLYCOL 3350 17 G/17G
17 POWDER, FOR SOLUTION ORAL DAILY
Refills: 0 | Status: DISCONTINUED | OUTPATIENT
Start: 2020-05-24 | End: 2020-05-25

## 2020-05-24 RX ORDER — POTASSIUM CHLORIDE 20 MEQ
40 PACKET (EA) ORAL EVERY 4 HOURS
Refills: 0 | Status: COMPLETED | OUTPATIENT
Start: 2020-05-24 | End: 2020-05-24

## 2020-05-24 RX ORDER — FLUTICASONE PROPIONATE 50 MCG
1 SPRAY, SUSPENSION NASAL
Refills: 0 | Status: DISCONTINUED | OUTPATIENT
Start: 2020-05-24 | End: 2020-05-25

## 2020-05-24 RX ADMIN — PANTOPRAZOLE SODIUM 40 MILLIGRAM(S): 20 TABLET, DELAYED RELEASE ORAL at 06:05

## 2020-05-24 RX ADMIN — Medication 1: at 17:01

## 2020-05-24 RX ADMIN — Medication 40 MILLIEQUIVALENT(S): at 14:21

## 2020-05-24 RX ADMIN — Medication 325 MILLIGRAM(S): at 11:10

## 2020-05-24 RX ADMIN — Medication 1 SPRAY(S): at 16:57

## 2020-05-24 RX ADMIN — Medication 1: at 11:59

## 2020-05-24 RX ADMIN — SENNA PLUS 2 TABLET(S): 8.6 TABLET ORAL at 21:28

## 2020-05-24 RX ADMIN — Medication 3 MILLILITER(S): at 02:46

## 2020-05-24 RX ADMIN — Medication 120 MILLIGRAM(S): at 06:05

## 2020-05-24 RX ADMIN — Medication 3 MILLILITER(S): at 07:50

## 2020-05-24 RX ADMIN — PANTOPRAZOLE SODIUM 40 MILLIGRAM(S): 20 TABLET, DELAYED RELEASE ORAL at 16:58

## 2020-05-24 RX ADMIN — Medication 1 MILLIGRAM(S): at 11:10

## 2020-05-24 RX ADMIN — Medication 20 MILLIGRAM(S): at 21:28

## 2020-05-24 RX ADMIN — Medication 20 MILLIGRAM(S): at 06:05

## 2020-05-24 RX ADMIN — Medication 1 TABLET(S): at 11:10

## 2020-05-24 RX ADMIN — POLYETHYLENE GLYCOL 3350 17 GRAM(S): 17 POWDER, FOR SOLUTION ORAL at 16:58

## 2020-05-24 RX ADMIN — SERTRALINE 100 MILLIGRAM(S): 25 TABLET, FILM COATED ORAL at 11:10

## 2020-05-24 RX ADMIN — Medication 40 MILLIEQUIVALENT(S): at 11:10

## 2020-05-24 RX ADMIN — Medication 3 MILLILITER(S): at 13:47

## 2020-05-24 RX ADMIN — Medication 20 MILLIGRAM(S): at 13:05

## 2020-05-24 NOTE — DISCHARGE NOTE PROVIDER - HOSPITAL COURSE
70 year old Female with PMHx of COPD (on home O2 via NC 5-6 L), chronic HFpEF (last documented TTE 5/2012), HTN, DM2, anxiety who presented with c/o SOB  and fatigue for four days. Patient also describes having a cough productive of white sputum, worsening lower extremity edema, left chest discomfort, and nausea. Every day her symptoms get worse, and are especially bad when she is trying to get around the house. Patient is on 5-6L O2 at home typically saturating in the low 90s. Daughter gave her nasal saline for breathing, which was not effective. With worsening SOB and LE edema, daughters called her cardiologist who prescribed a second diuretic metazolone along with torsemide 20 mg TID. Two days ago, daughter called her cardiologist, [place] patient was over diuresed and stopped her diuretics for the past two days. She denies fevers/chills, headache, vomiting, sick contacts.                 Patient was transferred from [place] ED on 5/18 with the following vitals:     T: 97.4, HR 84, /67, RR: 18, SpO2 94% on non-rebreather.    CBC significant for Hgb 7.4 (in 2014 hgb 13.2), D-dimer <150. INR/PT/Ptt 1.26/14.1/27.3. CMP sig for Na 124-->125 s/p 1L NS at [place] ED, K 3.0 (3.0 in pl ED s/p 40KCl PO x1), Cl 81, CO2 37, BUN/Cr 96/1.93-->90/2.20 (in [facility] ED). Trops x 1 negative. Serum BNP 5421 5421. Lactate negative    CXR: The lungs are clear. The heart size is mildly enlarged. Multilevel degenerative changes are noted within the imaged potions of the spine.    EKG: NSR with RBBB per cardio note    COVID 19 PCR negative    Blood culture, CRP and Procalcitonin are in process    Meds given: tylenol 650, NS 1 liter bolus, 40KCl PO        Pt. was admitted and had pulmonary and cardiology consultation.  She was started on IV steroids and Duoneb treatments.  She continued on her Revatio.  CT chest/abd/pelvis showed enlarged main pulmonary artery, possibly pulmonary arterial hypertension.  Possible mild pancreatitis.  LE dopplers showed no evidence of DVT.   Echo showed  Normal left ventricular systolic function.  Dilated right ventricle with reduced function.  Dilated right atrium.  Severe tricuspid regurgitation.    Severe pulmonary hypertension.  Clinically patient improved and her IV steroids were transitioned to PO prednisone.  Pt. had episode of abdominal pain and had  abdominal x-ray showing     Mild diffuse air-filled bowel ileus with moderate amount of stool within the rectum.  She was given bowel regimen with good response and resolution of abdominal pain. 70 year old Female with PMHx of COPD (on home O2 via NC 5-6 L), chronic HFpEF (last documented TTE 5/2012), HTN, DM2, anxiety who presented with c/o SOB  and fatigue for four days. Patient also describes having a cough productive of white sputum, worsening lower extremity edema, left chest discomfort, and nausea. Every day her symptoms get worse, and are especially bad when she is trying to get around the house. Patient is on 5-6L O2 at home typically saturating in the low 90s. Daughter gave her nasal saline for breathing, which was not effective. With worsening SOB and LE edema, daughters called her cardiologist who prescribed a second diuretic metazolone along with torsemide 20 mg TID. Two days ago, daughter called her cardiologist, [place] patient was over diuresed and stopped her diuretics for the past two days. She denies fevers/chills, headache, vomiting, sick contacts.                 Patient was transferred from [place] ED on 5/18 with the following vitals:     T: 97.4, HR 84, /67, RR: 18, SpO2 94% on non-rebreather.    CBC significant for Hgb 7.4 (in 2014 hgb 13.2), D-dimer <150. INR/PT/Ptt 1.26/14.1/27.3. CMP sig for Na 124-->125 s/p 1L NS at [place] ED, K 3.0 (3.0 in pl ED s/p 40KCl PO x1), Cl 81, CO2 37, BUN/Cr 96/1.93-->90/2.20 (in plv ED). Trops x 1 negative. Serum BNP 5421 5421. Lactate negative    CXR: The lungs are clear. The heart size is mildly enlarged. Multilevel degenerative changes are noted within the imaged potions of the spine.    EKG: NSR with RBBB per cardio note    COVID 19 PCR negative    Blood culture, CRP and Procalcitonin are in process    Meds given: tylenol 650, NS 1 liter bolus, 40KCl PO        Pt. was admitted and had pulmonary and cardiology consultation.  She was started on IV steroids and Duoneb treatments.  She continued on her Revatio.  CT chest/abd/pelvis showed enlarged main pulmonary artery, possibly pulmonary arterial hypertension.  Possible mild pancreatitis.  LE dopplers showed no evidence of DVT.   Echo showed  Normal left ventricular systolic function.  Dilated right ventricle with reduced function.  Dilated right atrium.  Severe tricuspid regurgitation.  Severe pulmonary hypertension.  Clinically patient improved and her IV steroids were transitioned to PO prednisone taper.  Pt. had episode of abdominal pain and had  abdominal x-ray showing Mild diffuse air-filled bowel ileus with moderate amount of stool within the rectum.  She was given bowel regimen with good response and resolution of abdominal pain.  Pt. also noted to have +stool OB.  GI consultation was requested.  Pt. ha previous workup which did not yield any source of bleeding.  Hbg has remained stable and patient continued on iron supplementation.          On day of discharge patient is in no distress.  On home baseline 6L nasal canula with no shortness of breath.   Pt. is afebrile and hemodynamically stable.          Completion of discharge in 40 minutes.

## 2020-05-24 NOTE — DISCHARGE NOTE PROVIDER - NSDCFUADDINST_GEN_ALL_CORE_FT
Please follow up with your PMD and Pulmonologist in 1 week.  Follow up with Cardiology in 1-2 weeks.  Follow up with GI in 2-4 weeks.

## 2020-05-24 NOTE — PROGRESS NOTE ADULT - SUBJECTIVE AND OBJECTIVE BOX
Chief Complaint: Shortness of breath    Interval Events: No events overnight. No complaints.    Review of Systems:  General: No fevers, chills, weight loss or gain  Skin: No rashes, color changes  Cardiovascular: No chest pain, orthopnea  Respiratory: No shortness of breath, cough  Gastrointestinal: No nausea, abdominal pain  Genitourinary: No incontinence, pain with urination  Musculoskeletal: No pain, swelling, decreased range of motion  Neurological: No headache, weakness  Psychiatric: No depression, anxiety  Endocrine: No weight loss or gain, increased thirst  All other systems are comprehensively negative.    Physical Exam:  Vital Signs Last 24 Hrs  T(C): 37.1 (24 May 2020 05:43), Max: 37.1 (24 May 2020 05:43)  T(F): 98.7 (24 May 2020 05:43), Max: 98.7 (24 May 2020 05:43)  HR: 91 (24 May 2020 07:54) (82 - 91)  BP: 106/67 (24 May 2020 05:43) (98/58 - 127/63)  BP(mean): --  RR: 16 (24 May 2020 05:43) (16 - 20)  SpO2: 90% (24 May 2020 07:52) (90% - 96%)  General: NAD  HEENT: MMM  Neck: No JVD, no carotid bruit  Lungs: CTAB  CV: RRR, nl S1/S2, no M/R/G  Abdomen: S/NT/ND, +BS  Extremities: 2+ LE edema, no cyanosis  Neuro: AAOx3, non-focal  Skin: No rash    Labs:             05-23    132<L>  |  93<L>  |  73<H>  ----------------------------<  158<H>  3.7   |  32<H>  |  1.50<H>    Ca    10.1      23 May 2020 08:22  Phos  2.8     05-23  Mg     2.5     05-23                          8.7    12.96 )-----------( 250      ( 23 May 2020 08:22 )             28.2     Telemetry: Sinus rhythm

## 2020-05-24 NOTE — DISCHARGE NOTE PROVIDER - CARE PROVIDER_API CALL
Joby Mera  NEPHROLOGY  [address] [facility] [address] [place] [id number]  Phone: [phone]  Fax: [phone]  Follow Up Time:     Nacho Rose  Pulmonary  Phone: (   )    -  Fax: (   )    -  Follow Up Time:     Maryam   Cardiology  Phone: (   )    -  Fax: (   )    -  Follow Up Time:     Jose Luis Lewis  GASTROENTEROLOGY  [address] [place] [id number]  Phone: [phone]  Fax: [phone]  Follow Up Time:

## 2020-05-24 NOTE — PROGRESS NOTE ADULT - SUBJECTIVE AND OBJECTIVE BOX
ALTAF GAVINO  70y  Female    Patient is a 70y old  Female who presents with a chief complaint of acute hypoxemic respiratory failure (24 May 2020 12:36)    comfortable, breathing much better    PAST MEDICAL & SURGICAL HISTORY:  Hypertension  Type 2 diabetes mellitus  COPD with hypoxia  Pulmonary hypertension  History of Cholecystectomy  HTN (Hypertension)  Obstructive Sleep Apnea  COPD (Chronic Obstructive Pulmonary Disease)  Diabetes Mellitus Type II  S/P tubal ligation  S/P Cholecystectomy          PHYSICAL EXAM:    T(C): 36.6 (05-24-20 @ 13:45), Max: 37.1 (05-24-20 @ 05:43)  HR: 74 (05-24-20 @ 13:45) (74 - 91)  BP: 112/74 (05-24-20 @ 13:45) (98/58 - 127/63)  RR: 20 (05-24-20 @ 13:45) (16 - 20)  SpO2: 93% (05-24-20 @ 13:45) (90% - 96%)  Wt(kg): --    I&O's Detail    23 May 2020 07:01  -  24 May 2020 07:00  --------------------------------------------------------  IN:    Oral Fluid: 420 mL  Total IN: 420 mL    OUT:    Voided: 500 mL  Total OUT: 500 mL    Total NET: -80 mL          Respiratory: clear anteriorly, decreased BS at bases  Cardiovascular: S1 S2  Gastrointestinal: soft NT ND +BS  Extremities: trace edema   Neuro: Awake and alert    MEDICATIONS  (STANDING):  albuterol/ipratropium for Nebulization 3 milliLiter(s) Nebulizer every 6 hours  dextrose 5%. 1000 milliLiter(s) (50 mL/Hr) IV Continuous <Continuous>  dextrose 5%. 1000 milliLiter(s) (50 mL/Hr) IV Continuous <Continuous>  dextrose 50% Injectable 12.5 Gram(s) IV Push once  dextrose 50% Injectable 25 Gram(s) IV Push once  dextrose 50% Injectable 25 Gram(s) IV Push once  dextrose 50% Injectable 12.5 Gram(s) IV Push once  dextrose 50% Injectable 25 Gram(s) IV Push once  dextrose 50% Injectable 25 Gram(s) IV Push once  diltiazem    milliGRAM(s) Oral daily  ferrous    sulfate 325 milliGRAM(s) Oral daily  fluticasone propionate 50 MICROgram(s)/spray Nasal Spray 1 Spray(s) Both Nostrils two times a day  folic acid 1 milliGRAM(s) Oral daily  insulin lispro (HumaLOG) corrective regimen sliding scale   SubCutaneous at bedtime  insulin lispro (HumaLOG) corrective regimen sliding scale   SubCutaneous three times a day before meals  multivitamin 1 Tablet(s) Oral daily  pantoprazole  Injectable 40 milliGRAM(s) IV Push every 12 hours  polyethylene glycol 3350 17 Gram(s) Oral daily  potassium chloride    Tablet ER 40 milliEquivalent(s) Oral every 4 hours  predniSONE   Tablet 20 milliGRAM(s) Oral daily  senna 2 Tablet(s) Oral at bedtime  sertraline 100 milliGRAM(s) Oral daily  sildenafil (REVATIO) 20 milliGRAM(s) Oral three times a day  torsemide 20 milliGRAM(s) Oral daily    MEDICATIONS  (PRN):  acetaminophen   Tablet .. 650 milliGRAM(s) Oral every 6 hours PRN Mild Pain (1 - 3), Moderate Pain (4 - 6)  bisacodyl 5 milliGRAM(s) Oral every 12 hours PRN Constipation  dextrose 40% Gel 15 Gram(s) Oral once PRN Blood Glucose LESS THAN 70 milliGRAM(s)/deciliter  dextrose 40% Gel 15 Gram(s) Oral once PRN Blood Glucose LESS THAN 70 milliGRAM(s)/deciliter  glucagon  Injectable 1 milliGRAM(s) IntraMuscular once PRN Glucose LESS THAN 70 milligrams/deciliter  glucagon  Injectable 1 milliGRAM(s) IntraMuscular once PRN Glucose LESS THAN 70 milligrams/deciliter  magnesium hydroxide Suspension 30 milliLiter(s) Oral daily PRN Constipation                            9.5    10.67 )-----------( 247      ( 24 May 2020 08:59 )             30.5       05-24    134<L>  |  91<L>  |  69<H>  ----------------------------<  140<H>  3.2<L>   |  35<H>  |  1.40<H>    Ca    10.1      24 May 2020 08:59  Phos  2.8     05-23  Mg     2.1     05-24        Creatinine Trend: Creatinine Trend: 1.40<--, 1.50<--, 1.70<--, 1.80<--, 1.80<--, 2.00<--

## 2020-05-24 NOTE — PROGRESS NOTE ADULT - ASSESSMENT
70 year old Female with COPD (on home O2 via NC 5-6 L), chronic HFpEF (last documented TTE 5/2012), HTN, DM2, anxiety who presented with c/o SOB  and fatigue. Found to have hyponatremia which is most likely due to diuretics and SIADH (chronic lung process). Will limit fluid intake to 1000 cc a day and continue the small dose of Torsemide and supplement potassium as ordered today.

## 2020-05-24 NOTE — PROGRESS NOTE ADULT - SUBJECTIVE AND OBJECTIVE BOX
CHIEF COMPLAINT/INTERVAL HISTORY:  Pt. seen and evaluated for Acute hypoxic respiratory failure 2/2 COPD and Pulmonary HTN.  Pt. reports feeling better.  No significant abdominal pain today.  Found to have + stool OB.  Hbg stable.      REVIEW OF SYSTEMS:  No fever, CP, SOB, or abdominal pain.     Vital Signs Last 24 Hrs  T(C): 37.1 (24 May 2020 05:43), Max: 37.1 (24 May 2020 05:43)  T(F): 98.7 (24 May 2020 05:43), Max: 98.7 (24 May 2020 05:43)  HR: 91 (24 May 2020 07:54) (82 - 91)  BP: 106/67 (24 May 2020 05:43) (98/58 - 127/63)  BP(mean): --  RR: 16 (24 May 2020 05:43) (16 - 20)  SpO2: 90% (24 May 2020 07:52) (90% - 96%)    PHYSICAL EXAM:  GENERAL: NAD  HEENT: EOMI, hearing normal, conjunctiva and sclera clear  Chest: Diminished BS bilaterally, no wheezing  CV: S1S2, RRR,   GI: soft, +BS, NT/ND  Musculoskeletal: 1+ LE edema  Psychiatric: affect nL, mood nL  Skin: warm and dry    LABS:                        9.5    10.67 )-----------( 247      ( 24 May 2020 08:59 )             30.5     05-24    134<L>  |  91<L>  |  69<H>  ----------------------------<  140<H>  3.2<L>   |  35<H>  |  1.40<H>    Ca    10.1      24 May 2020 08:59  Phos  2.8     05-23  Mg     2.1     05-24      Assessment and Plan:  -Acute hypoxic respiratory failure 2/2 COPD exacerbation and pulmonary HTN:  continue Prednisone 20mg PO daily, Duoneb tx Q6h, and Revatio 20mg PO TID.  Continue O2 support via nasal canula (Pt. reports using 6L at home).  Pulmonary f/u  -Abdominal pain/Constipation:  Improved.  Continue Senna 2tabs PO QHS, Dulcolax 5mg PO Q12h PRN, and Magnesium hydroxide 30mL PO daily PRN.   -Anemia:  stable.  stool OB positive.  Continue ferrous sulfate 325mg PO daily.  GI consult.  -Hyponatremia and MAURICIO:  improved.  Monitor while on diuretic.  Nephrology f/u    -HFpEF:  continue Torsemide 20mg PO daily.  Holding metolazone and spironolactone 2/2 MAURICIO.  Cardiology f/u  -Type 2 DM:  continue humalog sliding scale  -Hx of arrhythmia:  continue Cardizem CD 120mg PO daily  -VTE ppx:  SCD

## 2020-05-24 NOTE — CONSULT NOTE ADULT - REASON FOR ADMISSION
acute hypoxemic respiratory failure

## 2020-05-24 NOTE — CONSULT NOTE ADULT - SUBJECTIVE AND OBJECTIVE BOX
Chief Complaint:  Patient is a 70y old  Female who presents with a chief complaint of acute hypoxemic respiratory failure (24 May 2020 10:16)    Hypertension  Type 2 diabetes mellitus  COPD with hypoxia  Pulmonary hypertension  History of Cholecystectomy  HTN (Hypertension)  Obstructive Sleep Apnea  COPD (Chronic Obstructive Pulmonary Disease)  Diabetes Mellitus Type II  S/P tubal ligation  S/P Cholecystectomy     HPI:  70 year old Female with PMHx of COPD (on home O2 via NC 5-6 L), chronic HFpEF (last documented TTE 2012), HTN, DM2, anxiety who presented with c/o SOB  and fatigue for four days. Patient also describes having a cough productive of white sputum, worsening lower extremity edema, left chest discomfort, and nausea. Every day her symptoms get worse, and are especially bad when she is trying to get around the house. Patient is on 5-6L O2 at home typically saturating in the low 90s. Daughter gave her nasal saline for breathing, which was not effective. With worsening SOB and LE edema, daughters called her cardiologist who prescribed a second diuretic metazolone along with torsemide 20 mg TID. Two days ago, daughter called her cardiologist, [place] patient was over diuresed and stopped her diuretics for the past two days. She denies fevers/chills, headache, vomiting, sick contacts.         Patient was transferred from [place] [facility] on  with the following vitals:   T: 97.4, HR 84, /67, RR: 18, SpO2 94% on non-rebreather.  CBC significant for Hgb 7.4 (in  hgb 13.2), D-dimer <150. INR/PT/Ptt 1.26/14.1/27.3. CMP sig for Na 124-->125 s/p 1L NS at [facility], K 3.0 (3.0 in plv ED s/p 40KCl PO x1), Cl 81, CO2 37, BUN/Cr 96/1.93-->90/2.20 (in [facility]v ED). Trops x 1 negative. Serum BNP 5421 5421. Lactate negative  CXR: The lungs are clear. The heart size is mildly enlarged. Multilevel degenerative changes are noted within the imaged potions of the spine.  EKG: NSR with RBBB per cardio note  COVID 19 PCR in process  Blood culture, CRP and Procalcitonin are in process  Meds given: tylenol 650, NS 1 liter bolus, 40KCl PO (18 May 2020 19:41)    gi consulted for ob+ stool. chart reviewed. pt seen and examined.      recent vs/labs/imaging reviewed-  currently avss  sp 1u prbc   ct ap as below- poss mild pancreatitis but lipase normal on admission      No Known Allergies      acetaminophen   Tablet .. 650 milliGRAM(s) Oral every 6 hours PRN  albuterol/ipratropium for Nebulization 3 milliLiter(s) Nebulizer every 6 hours  bisacodyl 5 milliGRAM(s) Oral every 12 hours PRN  dextrose 40% Gel 15 Gram(s) Oral once PRN  dextrose 40% Gel 15 Gram(s) Oral once PRN  dextrose 5%. 1000 milliLiter(s) IV Continuous <Continuous>  dextrose 5%. 1000 milliLiter(s) IV Continuous <Continuous>  dextrose 50% Injectable 12.5 Gram(s) IV Push once  dextrose 50% Injectable 25 Gram(s) IV Push once  dextrose 50% Injectable 25 Gram(s) IV Push once  dextrose 50% Injectable 12.5 Gram(s) IV Push once  dextrose 50% Injectable 25 Gram(s) IV Push once  dextrose 50% Injectable 25 Gram(s) IV Push once  diltiazem    milliGRAM(s) Oral daily  ferrous    sulfate 325 milliGRAM(s) Oral daily  fluticasone propionate 50 MICROgram(s)/spray Nasal Spray 1 Spray(s) Both Nostrils two times a day  folic acid 1 milliGRAM(s) Oral daily  glucagon  Injectable 1 milliGRAM(s) IntraMuscular once PRN  glucagon  Injectable 1 milliGRAM(s) IntraMuscular once PRN  insulin lispro (HumaLOG) corrective regimen sliding scale   SubCutaneous at bedtime  insulin lispro (HumaLOG) corrective regimen sliding scale   SubCutaneous three times a day before meals  magnesium hydroxide Suspension 30 milliLiter(s) Oral daily PRN  multivitamin 1 Tablet(s) Oral daily  pantoprazole  Injectable 40 milliGRAM(s) IV Push every 12 hours  potassium chloride    Tablet ER 40 milliEquivalent(s) Oral every 4 hours  predniSONE   Tablet 20 milliGRAM(s) Oral daily  senna 2 Tablet(s) Oral at bedtime  sertraline 100 milliGRAM(s) Oral daily  sildenafil (REVATIO) 20 milliGRAM(s) Oral three times a day  torsemide 20 milliGRAM(s) Oral daily        FAMILY HISTORY:        Review of Systems:    General:  No wt loss, fevers, chills, night sweats, fatigue  Eyes:  Good vision, no reported pain  ENT:  No sore throat, pain, runny nose, dysphagia  CV:  No pain, palpitations, no lightheadedness  Resp:  No dyspnea, cough, tachypnea, wheezing  GI: see above  :  No pain, bleeding, incontinence, nocturia  Muscle:  No pain, weakness  Neuro:  No weakness, tingling, memory problems  Psych:  No fatigue, insomnia, mood problems, depression  Endocrine:  No polyuria, polydypsia, cold/heat intolerance  Heme:  No petechiae, ecchymosis, easy bruisability  Skin:  No rash, tattoos, scars, edema    Relevant Family History:   n/c    Relevant Social History: n/c      Physical Exam:    Vital Signs:  Vital Signs Last 24 Hrs  T(C): 37.1 (24 May 2020 05:43), Max: 37.1 (24 May 2020 05:43)  T(F): 98.7 (24 May 2020 05:43), Max: 98.7 (24 May 2020 05:43)  HR: 91 (24 May 2020 07:54) (82 - 91)  BP: 106/67 (24 May 2020 05:43) (98/58 - 127/63)  BP(mean): --  RR: 16 (24 May 2020 05:43) (16 - 20)  SpO2: 90% (24 May 2020 07:52) (90% - 96%)  Daily     Daily Weight in k.7 (24 May 2020 05:43)    General:  Appears stated age, well-groomed, nad  HEENT:  NC/AT,  conjunctivae clear and pink, no thyromegaly, nodules, adenopathy, no JVD  Chest:  Full & symmetric excursion, no increased effort, breath sounds clear  Cardiovascular:  Regular rhythm, S1, S2, no murmur/rub/S3/S4, no abdominal bruit, no edema  Abdomen:  Soft, non-tender, non-distended, normoactive bowel sounds,  no masses ,no hepatosplenomeagaly, no signs of chronic liver disease  Extremities:  no cyanosis,clubbing or edema  Skin:  No rash/erythema/ecchymoses/petechiae/wounds/abscess/warm/dry  Neuro/Psych:  A&O  , no asterixis, no tremor, no encephalopathy    Laboratory:                            9.5    10.67 )-----------( 247      ( 24 May 2020 08:59 )             30.5     05-24    134<L>  |  91<L>  |  69<H>  ----------------------------<  140<H>  3.2<L>   |  35<H>  |  1.40<H>    Ca    10.1      24 May 2020 08:59  Phos  2.8     05  Mg     2.1                   Imaging:    < from: CT Abdomen and Pelvis No Cont (20 @ 20:54) >    EXAM:  CT ABDOMEN AND PELVIS                          EXAM:  CT CHEST                            PROCEDURE DATE:  2020          INTERPRETATION:  CLINICAL INFORMATION: Hypoxemia. Abdominal distention.    COMPARISON: CT chest from 2012.    PROCEDURE:   CT of the Chest, Abdomen and Pelvis was performed without intravenous contrast.   Intravenous contrast: None.  Oral contrast: None.  Sagittal and coronal reformats were performed.    FINDINGS:    CHEST:     LUNGS AND LARGE AIRWAYS: Patent central airways. No pulmonary nodules.  PLEURA: No pleural effusion.  VESSELS: Enlarged main pulmonary artery, measuring 3.9 cm. Atherosclerotic changes of the aorta.  HEART: Cardiomegaly. No pericardial effusion.  MEDIASTINUM AND RAIN: Subcentimeter short axis mediastinal lymph nodes.  CHEST WALL AND LOWER NECK: Within normal limits.    ABDOMEN AND PELVIS:    LIVER: Within normal limits.  BILE DUCTS: Normal caliber.  GALLBLADDER: Cholecystectomy.  SPLEEN: Within normal limits.  PANCREAS: Mild peripancreatic fat stranding.  ADRENALS: Within normal limits.  KIDNEYS/URETERS: Within normal limits.    BLADDER: Within normal limits.  REPRODUCTIVE ORGANS: Posterior uterine leiomyoma.    BOWEL: No bowel obstruction.   PERITONEUM: No ascites.  VESSELS: Atherosclerotic changes.  RETROPERITONEUM/LYMPH NODES: No lymphadenopathy.    ABDOMINAL WALL: Small fat-containing umbilical hernia. Small amount of air inferior to the hernia sac. Small bilateral fat-containing inguinal hernias.  BONES: Degenerative changes.    IMPRESSION:     Enlarged main pulmonary artery, possibly pulmonary arterial hypertension.    Possible mild pancreatitis.                AAMIR HENLEY M.D., ATTENDING RADIOLOGIST  This document has been electronically signed. May 18 2020  9:00PM                < end of copied text > Chief Complaint:  Patient is a 70y old  Female who presents with a chief complaint of acute hypoxemic respiratory failure (24 May 2020 10:16)    Hypertension  Type 2 diabetes mellitus  COPD with hypoxia  Pulmonary hypertension  History of Cholecystectomy  HTN (Hypertension)  Obstructive Sleep Apnea  COPD (Chronic Obstructive Pulmonary Disease)  Diabetes Mellitus Type II  S/P tubal ligation  S/P Cholecystectomy     HPI:  70 year old Female with PMHx of COPD (on home O2 via NC 5-6 L), chronic HFpEF (last documented TTE 2012), HTN, DM2, anxiety who presented with c/o SOB  and fatigue for four days. Patient also describes having a cough productive of white sputum, worsening lower extremity edema, left chest discomfort, and nausea. Every day her symptoms get worse, and are especially bad when she is trying to get around the house. Patient is on 5-6L O2 at home typically saturating in the low 90s. Daughter gave her nasal saline for breathing, which was not effective. With worsening SOB and LE edema, daughters called her cardiologist who prescribed a second diuretic metazolone along with torsemide 20 mg TID. Two days ago, daughter called her cardiologist, [place] patient was over diuresed and stopped her diuretics for the past two days. She denies fevers/chills, headache, vomiting, sick contacts.         Patient was transferred from [place] ED on  with the following vitals:   T: 97.4, HR 84, /67, RR: 18, SpO2 94% on non-rebreather.  CBC significant for Hgb 7.4 (in  hgb 13.2), D-dimer <150. INR/PT/Ptt 1.26/14.1/27.3. CMP sig for Na 124-->125 s/p 1L NS at [facility] ED, K 3.0 (3.0 in plv ED s/p 40KCl PO x1), Cl 81, CO2 37, BUN/Cr 96/1.93-->90/2.20 (in plv ED). Trops x 1 negative. Serum BNP 5421 5421. Lactate negative  CXR: The lungs are clear. The heart size is mildly enlarged. Multilevel degenerative changes are noted within the imaged potions of the spine.  EKG: NSR with RBBB per cardio note  COVID 19 PCR in process  Blood culture, CRP and Procalcitonin are in process  Meds given: tylenol 650, NS 1 liter bolus, 40KCl PO (18 May 2020 19:41)    gi consulted for ob+ stool. chart reviewed. pt seen and examined. denies any overt s/s gib- no hematemesis, melena, brbpr. states ob+ stool is chronic, for years, etiology never found. used to follow w gi dr soto, sp egd/colon/capsule and all negative. states abd pain improved sp bm. fair po intake. denies f/c/n/v. denies ac anti plt nsaid use. hx of ccy, hh. fhx nc.      recent vs/labs/imaging reviewed-  currently avss on nc (on o2 at home)  sp 1u prbc   ct ap as below- poss mild pancreatitis lipase normal on admission  axrs noted      No Known Allergies      acetaminophen   Tablet .. 650 milliGRAM(s) Oral every 6 hours PRN  albuterol/ipratropium for Nebulization 3 milliLiter(s) Nebulizer every 6 hours  bisacodyl 5 milliGRAM(s) Oral every 12 hours PRN  dextrose 40% Gel 15 Gram(s) Oral once PRN  dextrose 40% Gel 15 Gram(s) Oral once PRN  dextrose 5%. 1000 milliLiter(s) IV Continuous <Continuous>  dextrose 5%. 1000 milliLiter(s) IV Continuous <Continuous>  dextrose 50% Injectable 12.5 Gram(s) IV Push once  dextrose 50% Injectable 25 Gram(s) IV Push once  dextrose 50% Injectable 25 Gram(s) IV Push once  dextrose 50% Injectable 12.5 Gram(s) IV Push once  dextrose 50% Injectable 25 Gram(s) IV Push once  dextrose 50% Injectable 25 Gram(s) IV Push once  diltiazem    milliGRAM(s) Oral daily  ferrous    sulfate 325 milliGRAM(s) Oral daily  fluticasone propionate 50 MICROgram(s)/spray Nasal Spray 1 Spray(s) Both Nostrils two times a day  folic acid 1 milliGRAM(s) Oral daily  glucagon  Injectable 1 milliGRAM(s) IntraMuscular once PRN  glucagon  Injectable 1 milliGRAM(s) IntraMuscular once PRN  insulin lispro (HumaLOG) corrective regimen sliding scale   SubCutaneous at bedtime  insulin lispro (HumaLOG) corrective regimen sliding scale   SubCutaneous three times a day before meals  magnesium hydroxide Suspension 30 milliLiter(s) Oral daily PRN  multivitamin 1 Tablet(s) Oral daily  pantoprazole  Injectable 40 milliGRAM(s) IV Push every 12 hours  potassium chloride    Tablet ER 40 milliEquivalent(s) Oral every 4 hours  predniSONE   Tablet 20 milliGRAM(s) Oral daily  senna 2 Tablet(s) Oral at bedtime  sertraline 100 milliGRAM(s) Oral daily  sildenafil (REVATIO) 20 milliGRAM(s) Oral three times a day  torsemide 20 milliGRAM(s) Oral daily        FAMILY HISTORY:        Review of Systems:    General:  No wt loss, fevers, chills, night sweats, fatigue  Eyes:  Good vision, no reported pain  ENT:  No sore throat, pain, runny nose, dysphagia  CV:  No pain, palpitations, no lightheadedness  Resp:  No dyspnea, cough, tachypnea, wheezing  GI: see above  :  No pain, bleeding, incontinence, nocturia  Muscle:  No pain, weakness  Neuro:  No weakness, tingling, memory problems  Psych:  No fatigue, insomnia, mood problems, depression  Endocrine:  No polyuria, polydypsia, cold/heat intolerance  Heme:  No petechiae, ecchymosis, easy bruisability  Skin:  No rash, tattoos, scars, edema    Relevant Family History:   n/c    Relevant Social History: n/c      Physical Exam:    Vital Signs:  Vital Signs Last 24 Hrs  T(C): 37.1 (24 May 2020 05:43), Max: 37.1 (24 May 2020 05:43)  T(F): 98.7 (24 May 2020 05:43), Max: 98.7 (24 May 2020 05:43)  HR: 91 (24 May 2020 07:54) (82 - 91)  BP: 106/67 (24 May 2020 05:43) (98/58 - 127/63)  BP(mean): --  RR: 16 (24 May 2020 05:43) (16 - 20)  SpO2: 90% (24 May 2020 07:52) (90% - 96%)  Daily     Daily Weight in k.7 (24 May 2020 05:43)    General:  nad lying in bed  HEENT:  NC/AT  Abdomen:  Soft, mild generalized ttp no guarding +dt  Extremities:  edema  Skin:  No rash  Neuro/Psych:  Awake alert appropriate    Laboratory:                            9.5    10.67 )-----------( 247      ( 24 May 2020 08:59 )             30.5     05-24    134<L>  |  91<L>  |  69<H>  ----------------------------<  140<H>  3.2<L>   |  35<H>  |  1.40<H>    Ca    10.1      24 May 2020 08:59  Phos  2.8       Mg     2.1                   Imaging:    < from: CT Abdomen and Pelvis No Cont (20 @ 20:54) >    EXAM:  CT ABDOMEN AND PELVIS                          EXAM:  CT CHEST                            PROCEDURE DATE:  2020          INTERPRETATION:  CLINICAL INFORMATION: Hypoxemia. Abdominal distention.    COMPARISON: CT chest from 2012.    PROCEDURE:   CT of the Chest, Abdomen and Pelvis was performed without intravenous contrast.   Intravenous contrast: None.  Oral contrast: None.  Sagittal and coronal reformats were performed.    FINDINGS:    CHEST:     LUNGS AND LARGE AIRWAYS: Patent central airways. No pulmonary nodules.  PLEURA: No pleural effusion.  VESSELS: Enlarged main pulmonary artery, measuring 3.9 cm. Atherosclerotic changes of the aorta.  HEART: Cardiomegaly. No pericardial effusion.  MEDIASTINUM AND RAIN: Subcentimeter short axis mediastinal lymph nodes.  CHEST WALL AND LOWER NECK: Within normal limits.    ABDOMEN AND PELVIS:    LIVER: Within normal limits.  BILE DUCTS: Normal caliber.  GALLBLADDER: Cholecystectomy.  SPLEEN: Within normal limits.  PANCREAS: Mild peripancreatic fat stranding.  ADRENALS: Within normal limits.  KIDNEYS/URETERS: Within normal limits.    BLADDER: Within normal limits.  REPRODUCTIVE ORGANS: Posterior uterine leiomyoma.    BOWEL: No bowel obstruction.   PERITONEUM: No ascites.  VESSELS: Atherosclerotic changes.  RETROPERITONEUM/LYMPH NODES: No lymphadenopathy.    ABDOMINAL WALL: Small fat-containing umbilical hernia. Small amount of air inferior to the hernia sac. Small bilateral fat-containing inguinal hernias.  BONES: Degenerative changes.    IMPRESSION:     Enlarged main pulmonary artery, possibly pulmonary arterial hypertension.    Possible mild pancreatitis.                AAMIR HENLEY M.D., ATTENDING RADIOLOGIST  This document has been electronically signed. May 18 2020  9:00PM                < end of copied text >

## 2020-05-24 NOTE — CONSULT NOTE ADULT - ASSESSMENT
ob+  anemia  hf  pulm htn ob+  anemia  constipation  hf  pulm htn      ob+ stool chronic per pt  hx of neg egd/colon/capsule  no s/s active gib; HD stable  hgb stable sp transfusion  monitor cbc, transfuse prn  cont proton pump inhibitor  cont bowel regimen  diet as tolerated  monitor exam/gi fxn  further care per primary

## 2020-05-24 NOTE — DISCHARGE NOTE PROVIDER - NSDCMRMEDTOKEN_GEN_ALL_CORE_FT
Cardizem 120 mg oral tablet: 1 tab(s) orally once a day  esomeprazole 40 mg oral delayed release capsule: 1 cap(s) orally once a day  folic acid: 1 tab(s) orally once a day  iron: 325 milligram(s) orally once a day  metFORMIN 500 mg oral tablet, extended release: 1 tab(s) orally once a day  metOLazone 2.5 mg oral tablet: 1 tab(s) orally once a day  multivitamin: 1 tab(s) orally once a day  sildenafil: 20 milligram(s) orally 3 times a day  spironolactone 25 mg oral tablet: 1 tab(s) orally once a day  torsemide: 20 milligram(s) orally 3 times a day  Trelegy Ellipta inhalation powder: 1 puff(s) inhaled once a day  Vitamin D: 1000 milligram(s) orally once a day  Zoloft 100 mg oral tablet: 1 tab(s) orally once a day  ZyrTEC: 1 tab(s) orally once a day bisacodyl 5 mg oral delayed release tablet: 1 tab(s) orally every 12 hours, As needed, Constipation  Cardizem 120 mg oral tablet: 1 tab(s) orally once a day  esomeprazole 40 mg oral delayed release capsule: 1 cap(s) orally once a day  ferrous sulfate 325 mg (65 mg elemental iron) oral tablet: 1 tab(s) orally once a day  fluticasone 50 mcg/inh nasal spray: 1 spray(s) nasal 2 times a day  folic acid: 1 tab(s) orally once a day  metFORMIN 500 mg oral tablet, extended release: 1 tab(s) orally once a day  multivitamin: 1 tab(s) orally once a day  polyethylene glycol 3350 oral powder for reconstitution: 17 gram(s) orally once a day  predniSONE 10 mg oral tablet: 1 tab(s) orally once a day  senna oral tablet: 2 tab(s) orally once a day (at bedtime)  sildenafil: 20 milligram(s) orally 3 times a day  torsemide 20 mg oral tablet: 1 tab(s) orally once a day  Trelegy Ellipta inhalation powder: 1 puff(s) inhaled once a day  Vitamin D: 1000 milligram(s) orally once a day  Zoloft 100 mg oral tablet: 1 tab(s) orally once a day  ZyrTEC: 1 tab(s) orally once a day

## 2020-05-24 NOTE — CHART NOTE - NSCHARTNOTEFT_GEN_A_CORE
Called by RN; patient noted to have change from normal sinus rhythm to junctional rhythm to sinus rhythm on remote tele. Patient seen and evaluated at bedside. Currently has no complaints, states she would like to go home tomorrow morning. Patient admits to occasional palpitations, but states that the palpitations are no different than her normal palpitations. Denies chest pain, jaw pain, nausea, diaphoresis, or SOB.     T(C): 36.7 (05-24-20 @ 19:24), Max: 37.1 (05-24-20 @ 05:43)  HR: 78 (05-24-20 @ 19:24) (72 - 91)  BP: 90/60 (05-24-20 @ 19:24) (90/60 - 112/74)  RR: 18 (05-24-20 @ 19:24) (16 - 20)  SpO2: 98% (05-24-20 @ 19:24) (90% - 98%)    GENERAL: patient appears well, in no acute distress, appropriate, pleasant  ENMT: moist mucous membranes  LUNGS: good air entry bilaterally, clear to auscultation, symmetric breath sounds, no wheezing or rhonchi appreciated  HEART: soft S1/S2, regular rate and rhythm, no murmurs noted, no lower extremity edema  NEUROLOGIC: awake, alert, oriented x3, good muscle tone in 4 extremities, no obvious sensory deficits    A/P   The patient is a 70 year old female with a history of HTN, DM, chronic diastolic heart failure, COPD, pulmonary hypertension who presents with shortness of breath, likely multifactorial from COPD, chronic diastolic heart failure. Patient noted to have change from NSR--> junctional rhythm--> NSR on remote tele  -Patient seen and evaluated at bedside, patient appears comfortable, in no acute distress. Admits to occasional palpitations   -STAT EKG ordered- shows no gross change from prior   -STAT BMP, Mg, Phos ordered   -Will continue to monitor; RN to call if any changes

## 2020-05-24 NOTE — DISCHARGE NOTE PROVIDER - CARE PROVIDERS DIRECT ADDRESSES
,DirectAddress_Unknown,DirectAddress_Unknown,DirectAddress_Unknown,[email or web address]

## 2020-05-24 NOTE — PROGRESS NOTE ADULT - ASSESSMENT
The patient is a 70 year old female with a history of HTN, DM, chronic diastolic heart failure, COPD, pulmonary hypertension who presents with shortness of breath, likely multifactorial from COPD, chronic diastolic heart failure.    Plan:  - Echo with right-sided heart failure, severe TR and severe pulm HTN  - BNP elevated at 5421  - CXR with clear lungs  - COVID-19 negative  - Continue torsemide 20 mg daily. Can be titrated up further as an outpatient.  - Hold spironolactone  - Hold metolazone  - Continue sildenafil 20 mg tid  - Continue diltiazem 120 mg daily  - Pulm follow-up  - Patient to discuss with her outpatient physicians regarding evaluation at tertiary center with a pulmonary hypertension specialist  - Discharge planning

## 2020-05-24 NOTE — DISCHARGE NOTE PROVIDER - NSDCCPCAREPLAN_GEN_ALL_CORE_FT
PRINCIPAL DISCHARGE DIAGNOSIS  Diagnosis: Shortness of breath  Assessment and Plan of Treatment: secondary to COPD exacerbation and pulmonary hypertension.  Complete prednisone taper and continue Revatio.  Continue to use 6L nasal canula to maintain SpO2>90%.  Consider with your PMD referal to tertiary center for evaluation with Pulmonary hypertension specialist.  Follow up with Pulmonary in 1 week.      SECONDARY DISCHARGE DIAGNOSES  Diagnosis: Anemia, unspecified type  Assessment and Plan of Treatment: Your hemoglobin is stable.  Continue ferrous sulfate.  Follow up with GI in 1-2 weeks.    Diagnosis: Hyponatremia  Assessment and Plan of Treatment: improved.    Diagnosis: Chronic heart failure  Assessment and Plan of Treatment: continue torsemide.  Follow up with your cardioilogist to decide if your diuretic regimen needs to be adjusted.

## 2020-05-24 NOTE — DISCHARGE NOTE PROVIDER - PROVIDER TOKENS
PROVIDER:[TOKEN:[[id number]]],FREE:[LAST:[Thurm],FIRST:[Nacho],PHONE:[(   )    -],FAX:[(   )    -],ADDRESS:[Pulmonary]],FREE:[LAST:[Medelson],PHONE:[(   )    -],FAX:[(   )    -],ADDRESS:[Cardiology]],PROVIDER:[TOKEN:[[id number]]]

## 2020-05-24 NOTE — PROGRESS NOTE ADULT - SUBJECTIVE AND OBJECTIVE BOX
Date/Time Patient Seen:  		  Referring MD:   Data Reviewed	       Patient is a 70y old  Female who presents with a chief complaint of acute hypoxemic respiratory failure (23 May 2020 15:27)      Subjective/HPI     PAST MEDICAL & SURGICAL HISTORY:  Hypertension  Type 2 diabetes mellitus  COPD with hypoxia  Pulmonary hypertension  History of Cholecystectomy  HTN (Hypertension)  Obstructive Sleep Apnea  COPD (Chronic Obstructive Pulmonary Disease)  Diabetes Mellitus Type II  S/P tubal ligation  S/P Cholecystectomy        Medication list         MEDICATIONS  (STANDING):  albuterol/ipratropium for Nebulization 3 milliLiter(s) Nebulizer every 6 hours  dextrose 5%. 1000 milliLiter(s) (50 mL/Hr) IV Continuous <Continuous>  dextrose 5%. 1000 milliLiter(s) (50 mL/Hr) IV Continuous <Continuous>  dextrose 50% Injectable 12.5 Gram(s) IV Push once  dextrose 50% Injectable 25 Gram(s) IV Push once  dextrose 50% Injectable 25 Gram(s) IV Push once  dextrose 50% Injectable 12.5 Gram(s) IV Push once  dextrose 50% Injectable 25 Gram(s) IV Push once  dextrose 50% Injectable 25 Gram(s) IV Push once  diltiazem    milliGRAM(s) Oral daily  ferrous    sulfate 325 milliGRAM(s) Oral daily  folic acid 1 milliGRAM(s) Oral daily  insulin lispro (HumaLOG) corrective regimen sliding scale   SubCutaneous at bedtime  insulin lispro (HumaLOG) corrective regimen sliding scale   SubCutaneous three times a day before meals  multivitamin 1 Tablet(s) Oral daily  pantoprazole  Injectable 40 milliGRAM(s) IV Push every 12 hours  predniSONE   Tablet 20 milliGRAM(s) Oral daily  senna 2 Tablet(s) Oral at bedtime  sertraline 100 milliGRAM(s) Oral daily  sildenafil (REVATIO) 20 milliGRAM(s) Oral three times a day  torsemide 20 milliGRAM(s) Oral daily    MEDICATIONS  (PRN):  acetaminophen   Tablet .. 650 milliGRAM(s) Oral every 6 hours PRN Mild Pain (1 - 3), Moderate Pain (4 - 6)  bisacodyl 5 milliGRAM(s) Oral every 12 hours PRN Constipation  dextrose 40% Gel 15 Gram(s) Oral once PRN Blood Glucose LESS THAN 70 milliGRAM(s)/deciliter  dextrose 40% Gel 15 Gram(s) Oral once PRN Blood Glucose LESS THAN 70 milliGRAM(s)/deciliter  glucagon  Injectable 1 milliGRAM(s) IntraMuscular once PRN Glucose LESS THAN 70 milligrams/deciliter  glucagon  Injectable 1 milliGRAM(s) IntraMuscular once PRN Glucose LESS THAN 70 milligrams/deciliter  magnesium hydroxide Suspension 30 milliLiter(s) Oral daily PRN Constipation         Vitals log        ICU Vital Signs Last 24 Hrs  T(C): 37.1 (24 May 2020 05:43), Max: 37.1 (24 May 2020 05:43)  T(F): 98.7 (24 May 2020 05:43), Max: 98.7 (24 May 2020 05:43)  HR: 87 (24 May 2020 05:43) (75 - 90)  BP: 106/677 (24 May 2020 05:43) (98/58 - 127/63)  BP(mean): --  ABP: --  ABP(mean): --  RR: 16 (24 May 2020 05:43) (16 - 20)  SpO2: 96% (24 May 2020 05:43) (92% - 96%)           Input and Output:  I&O's Detail    23 May 2020 07:01  -  24 May 2020 06:20  --------------------------------------------------------  IN:    Oral Fluid: 420 mL  Total IN: 420 mL    OUT:  Total OUT: 0 mL    Total NET: 420 mL          Lab Data                        8.7    12.96 )-----------( 250      ( 23 May 2020 08:22 )             28.2     05-23    132<L>  |  93<L>  |  73<H>  ----------------------------<  158<H>  3.7   |  32<H>  |  1.50<H>    Ca    10.1      23 May 2020 08:22  Phos  2.8     05-23  Mg     2.5     05-23              Review of Systems	      Objective     Physical Examination    heart s1s2  lung dec BS  abd soft      Pertinent Lab findings & Imaging      Billy:  NO   Adequate UO     I&O's Detail    23 May 2020 07:01  -  24 May 2020 06:20  --------------------------------------------------------  IN:    Oral Fluid: 420 mL  Total IN: 420 mL    OUT:  Total OUT: 0 mL    Total NET: 420 mL               Discussed with:     Cultures:	        Radiology

## 2020-05-25 VITALS
WEIGHT: 195.77 LBS | RESPIRATION RATE: 17 BRPM | SYSTOLIC BLOOD PRESSURE: 115 MMHG | HEART RATE: 87 BPM | TEMPERATURE: 98 F | OXYGEN SATURATION: 96 % | DIASTOLIC BLOOD PRESSURE: 73 MMHG

## 2020-05-25 LAB
ANION GAP SERPL CALC-SCNC: 7 MMOL/L — SIGNIFICANT CHANGE UP (ref 5–17)
BUN SERPL-MCNC: 66 MG/DL — HIGH (ref 7–23)
CALCIUM SERPL-MCNC: 9.6 MG/DL — SIGNIFICANT CHANGE UP (ref 8.5–10.1)
CHLORIDE SERPL-SCNC: 94 MMOL/L — LOW (ref 96–108)
CO2 SERPL-SCNC: 33 MMOL/L — HIGH (ref 22–31)
CREAT SERPL-MCNC: 1.4 MG/DL — HIGH (ref 0.5–1.3)
GLUCOSE SERPL-MCNC: 150 MG/DL — HIGH (ref 70–99)
HCT VFR BLD CALC: 29.8 % — LOW (ref 34.5–45)
HGB BLD-MCNC: 9.2 G/DL — LOW (ref 11.5–15.5)
MAGNESIUM SERPL-MCNC: 2.1 MG/DL — SIGNIFICANT CHANGE UP (ref 1.6–2.6)
MCHC RBC-ENTMCNC: 24.9 PG — LOW (ref 27–34)
MCHC RBC-ENTMCNC: 30.9 GM/DL — LOW (ref 32–36)
MCV RBC AUTO: 80.8 FL — SIGNIFICANT CHANGE UP (ref 80–100)
NRBC # BLD: 0 /100 WBCS — SIGNIFICANT CHANGE UP (ref 0–0)
PLATELET # BLD AUTO: 241 K/UL — SIGNIFICANT CHANGE UP (ref 150–400)
POTASSIUM SERPL-MCNC: 3.8 MMOL/L — SIGNIFICANT CHANGE UP (ref 3.5–5.3)
POTASSIUM SERPL-SCNC: 3.8 MMOL/L — SIGNIFICANT CHANGE UP (ref 3.5–5.3)
RBC # BLD: 3.69 M/UL — LOW (ref 3.8–5.2)
RBC # FLD: 15.4 % — HIGH (ref 10.3–14.5)
SODIUM SERPL-SCNC: 134 MMOL/L — LOW (ref 135–145)
WBC # BLD: 10.36 K/UL — SIGNIFICANT CHANGE UP (ref 3.8–10.5)
WBC # FLD AUTO: 10.36 K/UL — SIGNIFICANT CHANGE UP (ref 3.8–10.5)

## 2020-05-25 PROCEDURE — 83935 ASSAY OF URINE OSMOLALITY: CPT

## 2020-05-25 PROCEDURE — 71250 CT THORAX DX C-: CPT

## 2020-05-25 PROCEDURE — 36415 COLL VENOUS BLD VENIPUNCTURE: CPT

## 2020-05-25 PROCEDURE — 94640 AIRWAY INHALATION TREATMENT: CPT

## 2020-05-25 PROCEDURE — 94760 N-INVAS EAR/PLS OXIMETRY 1: CPT

## 2020-05-25 PROCEDURE — 97161 PT EVAL LOW COMPLEX 20 MIN: CPT

## 2020-05-25 PROCEDURE — 87798 DETECT AGENT NOS DNA AMP: CPT

## 2020-05-25 PROCEDURE — 85027 COMPLETE CBC AUTOMATED: CPT

## 2020-05-25 PROCEDURE — 87581 M.PNEUMON DNA AMP PROBE: CPT

## 2020-05-25 PROCEDURE — 84133 ASSAY OF URINE POTASSIUM: CPT

## 2020-05-25 PROCEDURE — 87899 AGENT NOS ASSAY W/OPTIC: CPT

## 2020-05-25 PROCEDURE — 82570 ASSAY OF URINE CREATININE: CPT

## 2020-05-25 PROCEDURE — 84443 ASSAY THYROID STIM HORMONE: CPT

## 2020-05-25 PROCEDURE — 82962 GLUCOSE BLOOD TEST: CPT

## 2020-05-25 PROCEDURE — 74176 CT ABD & PELVIS W/O CONTRAST: CPT

## 2020-05-25 PROCEDURE — 86901 BLOOD TYPING SEROLOGIC RH(D): CPT

## 2020-05-25 PROCEDURE — 87633 RESP VIRUS 12-25 TARGETS: CPT

## 2020-05-25 PROCEDURE — 83036 HEMOGLOBIN GLYCOSYLATED A1C: CPT

## 2020-05-25 PROCEDURE — 86900 BLOOD TYPING SEROLOGIC ABO: CPT

## 2020-05-25 PROCEDURE — 83735 ASSAY OF MAGNESIUM: CPT

## 2020-05-25 PROCEDURE — P9016: CPT

## 2020-05-25 PROCEDURE — 93970 EXTREMITY STUDY: CPT

## 2020-05-25 PROCEDURE — 99239 HOSP IP/OBS DSCHRG MGMT >30: CPT

## 2020-05-25 PROCEDURE — 86923 COMPATIBILITY TEST ELECTRIC: CPT

## 2020-05-25 PROCEDURE — 93005 ELECTROCARDIOGRAM TRACING: CPT

## 2020-05-25 PROCEDURE — 93306 TTE W/DOPPLER COMPLETE: CPT

## 2020-05-25 PROCEDURE — 87486 CHLMYD PNEUM DNA AMP PROBE: CPT

## 2020-05-25 PROCEDURE — 99285 EMERGENCY DEPT VISIT HI MDM: CPT

## 2020-05-25 PROCEDURE — 36430 TRANSFUSION BLD/BLD COMPNT: CPT

## 2020-05-25 PROCEDURE — 82803 BLOOD GASES ANY COMBINATION: CPT

## 2020-05-25 PROCEDURE — 84300 ASSAY OF URINE SODIUM: CPT

## 2020-05-25 PROCEDURE — 83930 ASSAY OF BLOOD OSMOLALITY: CPT

## 2020-05-25 PROCEDURE — 83690 ASSAY OF LIPASE: CPT

## 2020-05-25 PROCEDURE — 84100 ASSAY OF PHOSPHORUS: CPT

## 2020-05-25 PROCEDURE — 86850 RBC ANTIBODY SCREEN: CPT

## 2020-05-25 PROCEDURE — 93010 ELECTROCARDIOGRAM REPORT: CPT

## 2020-05-25 PROCEDURE — 86803 HEPATITIS C AB TEST: CPT

## 2020-05-25 PROCEDURE — 80053 COMPREHEN METABOLIC PANEL: CPT

## 2020-05-25 PROCEDURE — 99221 1ST HOSP IP/OBS SF/LOW 40: CPT

## 2020-05-25 PROCEDURE — 87449 NOS EACH ORGANISM AG IA: CPT

## 2020-05-25 PROCEDURE — 82533 TOTAL CORTISOL: CPT

## 2020-05-25 PROCEDURE — 80048 BASIC METABOLIC PNL TOTAL CA: CPT

## 2020-05-25 PROCEDURE — 94660 CPAP INITIATION&MGMT: CPT

## 2020-05-25 PROCEDURE — 74018 RADEX ABDOMEN 1 VIEW: CPT

## 2020-05-25 PROCEDURE — 82272 OCCULT BLD FECES 1-3 TESTS: CPT

## 2020-05-25 RX ORDER — SENNA PLUS 8.6 MG/1
2 TABLET ORAL
Qty: 0 | Refills: 0 | DISCHARGE
Start: 2020-05-25

## 2020-05-25 RX ORDER — POLYETHYLENE GLYCOL 3350 17 G/17G
17 POWDER, FOR SOLUTION ORAL
Qty: 0 | Refills: 0 | DISCHARGE
Start: 2020-05-25

## 2020-05-25 RX ORDER — FERROUS SULFATE 325(65) MG
1 TABLET ORAL
Qty: 30 | Refills: 0
Start: 2020-05-25

## 2020-05-25 RX ORDER — FLUTICASONE PROPIONATE 50 MCG
1 SPRAY, SUSPENSION NASAL
Qty: 0 | Refills: 0 | DISCHARGE
Start: 2020-05-25

## 2020-05-25 RX ORDER — SPIRONOLACTONE 25 MG/1
1 TABLET, FILM COATED ORAL
Qty: 0 | Refills: 0 | DISCHARGE

## 2020-05-25 RX ADMIN — Medication 1: at 08:18

## 2020-05-25 RX ADMIN — Medication 120 MILLIGRAM(S): at 05:45

## 2020-05-25 RX ADMIN — Medication 20 MILLIGRAM(S): at 05:49

## 2020-05-25 RX ADMIN — Medication 20 MILLIGRAM(S): at 05:45

## 2020-05-25 RX ADMIN — Medication 1 SPRAY(S): at 05:45

## 2020-05-25 RX ADMIN — PANTOPRAZOLE SODIUM 40 MILLIGRAM(S): 20 TABLET, DELAYED RELEASE ORAL at 05:45

## 2020-05-25 RX ADMIN — Medication 1 TABLET(S): at 13:06

## 2020-05-25 RX ADMIN — SERTRALINE 100 MILLIGRAM(S): 25 TABLET, FILM COATED ORAL at 13:07

## 2020-05-25 NOTE — PROGRESS NOTE ADULT - SUBJECTIVE AND OBJECTIVE BOX
Chief Complaint: Shortness of breath    Interval Events: No events overnight. No complaints.    Review of Systems:  General: No fevers, chills, weight loss or gain  Skin: No rashes, color changes  Cardiovascular: No chest pain, orthopnea  Respiratory: No shortness of breath, cough  Gastrointestinal: No nausea, abdominal pain  Genitourinary: No incontinence, pain with urination  Musculoskeletal: No pain, swelling, decreased range of motion  Neurological: No headache, weakness  Psychiatric: No depression, anxiety  Endocrine: No weight loss or gain, increased thirst  All other systems are comprehensively negative.    Physical Exam:  Vital Signs Last 24 Hrs  T(C): 36.4 (25 May 2020 05:05), Max: 36.7 (24 May 2020 19:24)  T(F): 97.5 (25 May 2020 05:05), Max: 98.1 (24 May 2020 19:24)  HR: 87 (25 May 2020 05:05) (65 - 87)  BP: 115/73 (25 May 2020 05:05) (90/60 - 115/73)  BP(mean): --  RR: 17 (25 May 2020 05:05) (17 - 20)  SpO2: 96% (25 May 2020 05:05) (92% - 98%)  General: NAD  HEENT: MMM  Neck: No JVD, no carotid bruit  Lungs: CTAB  CV: RRR, nl S1/S2, no M/R/G  Abdomen: S/NT/ND, +BS  Extremities: 2+ LE edema, no cyanosis  Neuro: AAOx3, non-focal  Skin: No rash    Labs:             05-25    134<L>  |  94<L>  |  66<H>  ----------------------------<  150<H>  3.8   |  33<H>  |  1.40<H>    Ca    9.6      25 May 2020 08:25  Phos  3.0     05-24  Mg     2.1     05-25                          9.2    10.36 )-----------( 241      ( 25 May 2020 08:25 )             29.8     Telemetry: Sinus rhythm, accelerated junctional rhythm

## 2020-05-25 NOTE — PROGRESS NOTE ADULT - SUBJECTIVE AND OBJECTIVE BOX
Chief Complaint:  Patient is a 70y old  Female who presents with a chief complaint of acute hypoxemic respiratory failure (24 May 2020 10:16)    Hypertension  Type 2 diabetes mellitus  COPD with hypoxia  Pulmonary hypertension  History of Cholecystectomy  HTN (Hypertension)  Obstructive Sleep Apnea  COPD (Chronic Obstructive Pulmonary Disease)  Diabetes Mellitus Type II  S/P tubal ligation  S/P Cholecystectomy     HPI:  70 year old Female with PMHx of COPD (on home O2 via NC 5-6 L), chronic HFpEF (last documented TTE 2012), HTN, DM2, anxiety who presented with c/o SOB  and fatigue for four days. Patient also describes having a cough productive of white sputum, worsening lower extremity edema, left chest discomfort, and nausea. Every day her symptoms get worse, and are especially bad when she is trying to get around the house. Patient is on 5-6L O2 at home typically saturating in the low 90s. Daughter gave her nasal saline for breathing, which was not effective. With worsening SOB and LE edema, daughters called her cardiologist who prescribed a second diuretic metazolone along with torsemide 20 mg TID. Two days ago, daughter called her cardiologist, [place] patient was over diuresed and stopped her diuretics for the past two days. She denies fevers/chills, headache, vomiting, sick contacts.         Patient was transferred from [place] ED on  with the following vitals:   T: 97.4, HR 84, /67, RR: 18, SpO2 94% on non-rebreather.  CBC significant for Hgb 7.4 (in  hgb 13.2), D-dimer <150. INR/PT/Ptt 1.26/14.1/27.3. CMP sig for Na 124-->125 s/p 1L NS at [place] [facility], K 3.0 (3.0 in [facility] ED s/p 40KCl PO x1), Cl 81, CO2 37, BUN/Cr 96/1.93-->90/2.20 (in [facility] ED). Trops x 1 negative. Serum BNP 5421 5421. Lactate negative  CXR: The lungs are clear. The heart size is mildly enlarged. Multilevel degenerative changes are noted within the imaged potions of the spine.  EKG: NSR with RBBB per cardio note  COVID 19 PCR in process  Blood culture, CRP and Procalcitonin are in process  Meds given: tylenol 650, NS 1 liter bolus, 40KCl PO (18 May 2020 19:41)    gi consulted for ob+ stool. chart reviewed. pt seen and examined. denies any overt s/s gib- no hematemesis, melena, brbpr. states ob+ stool is chronic, for years, etiology never found. used to follow w gi dr soto, sp egd/colon/capsule and all negative. states abd pain improved sp bm. fair po intake. denies f/c/n/v. denies ac anti plt nsaid use. hx of ccy, hh. fhx nc.      recent vs/labs/imaging reviewed-  currently avss on nc (on o2 at home)  sp 1u prbc   ct ap as below- poss mild pancreatitis lipase normal on admission  axrs noted      No Known Allergies      acetaminophen   Tablet .. 650 milliGRAM(s) Oral every 6 hours PRN  albuterol/ipratropium for Nebulization 3 milliLiter(s) Nebulizer every 6 hours  bisacodyl 5 milliGRAM(s) Oral every 12 hours PRN  dextrose 40% Gel 15 Gram(s) Oral once PRN  dextrose 40% Gel 15 Gram(s) Oral once PRN  dextrose 5%. 1000 milliLiter(s) IV Continuous <Continuous>  dextrose 5%. 1000 milliLiter(s) IV Continuous <Continuous>  dextrose 50% Injectable 12.5 Gram(s) IV Push once  dextrose 50% Injectable 25 Gram(s) IV Push once  dextrose 50% Injectable 25 Gram(s) IV Push once  dextrose 50% Injectable 12.5 Gram(s) IV Push once  dextrose 50% Injectable 25 Gram(s) IV Push once  dextrose 50% Injectable 25 Gram(s) IV Push once  diltiazem    milliGRAM(s) Oral daily  ferrous    sulfate 325 milliGRAM(s) Oral daily  fluticasone propionate 50 MICROgram(s)/spray Nasal Spray 1 Spray(s) Both Nostrils two times a day  folic acid 1 milliGRAM(s) Oral daily  glucagon  Injectable 1 milliGRAM(s) IntraMuscular once PRN  glucagon  Injectable 1 milliGRAM(s) IntraMuscular once PRN  insulin lispro (HumaLOG) corrective regimen sliding scale   SubCutaneous at bedtime  insulin lispro (HumaLOG) corrective regimen sliding scale   SubCutaneous three times a day before meals  magnesium hydroxide Suspension 30 milliLiter(s) Oral daily PRN  multivitamin 1 Tablet(s) Oral daily  pantoprazole  Injectable 40 milliGRAM(s) IV Push every 12 hours  potassium chloride    Tablet ER 40 milliEquivalent(s) Oral every 4 hours  predniSONE   Tablet 20 milliGRAM(s) Oral daily  senna 2 Tablet(s) Oral at bedtime  sertraline 100 milliGRAM(s) Oral daily  sildenafil (REVATIO) 20 milliGRAM(s) Oral three times a day  torsemide 20 milliGRAM(s) Oral daily        FAMILY HISTORY:        Review of Systems:    General:  No wt loss, fevers, chills, night sweats, fatigue  Eyes:  Good vision, no reported pain  ENT:  No sore throat, pain, runny nose, dysphagia  CV:  No pain, palpitations, no lightheadedness  Resp:  No dyspnea, cough, tachypnea, wheezing  GI: see above  :  No pain, bleeding, incontinence, nocturia  Muscle:  No pain, weakness  Neuro:  No weakness, tingling, memory problems  Psych:  No fatigue, insomnia, mood problems, depression  Endocrine:  No polyuria, polydypsia, cold/heat intolerance  Heme:  No petechiae, ecchymosis, easy bruisability  Skin:  No rash, tattoos, scars, edema    Relevant Family History:   n/c    Relevant Social History: n/c      Physical Exam:    Vital Signs:  Vital Signs Last 24 Hrs  T(C): 37.1 (24 May 2020 05:43), Max: 37.1 (24 May 2020 05:43)  T(F): 98.7 (24 May 2020 05:43), Max: 98.7 (24 May 2020 05:43)  HR: 91 (24 May 2020 07:54) (82 - 91)  BP: 106/67 (24 May 2020 05:43) (98/58 - 127/63)  BP(mean): --  RR: 16 (24 May 2020 05:43) (16 - 20)  SpO2: 90% (24 May 2020 07:52) (90% - 96%)  Daily     Daily Weight in k.7 (24 May 2020 05:43)    General:  nad lying in bed  HEENT:  NC/AT  Abdomen:  Soft, mild generalized ttp no guarding +dt  Extremities:  edema  Skin:  No rash  Neuro/Psych:  Awake alert appropriate    Laboratory:                            9.5    10.67 )-----------( 247      ( 24 May 2020 08:59 )             30.5     05-24    134<L>  |  91<L>  |  69<H>  ----------------------------<  140<H>  3.2<L>   |  35<H>  |  1.40<H>    Ca    10.1      24 May 2020 08:59  Phos  2.8       Mg     2.1                   Imaging:    < from: CT Abdomen and Pelvis No Cont (20 @ 20:54) >    EXAM:  CT ABDOMEN AND PELVIS                          EXAM:  CT CHEST                            PROCEDURE DATE:  2020          INTERPRETATION:  CLINICAL INFORMATION: Hypoxemia. Abdominal distention.    COMPARISON: CT chest from 2012.    PROCEDURE:   CT of the Chest, Abdomen and Pelvis was performed without intravenous contrast.   Intravenous contrast: None.  Oral contrast: None.  Sagittal and coronal reformats were performed.    FINDINGS:    CHEST:     LUNGS AND LARGE AIRWAYS: Patent central airways. No pulmonary nodules.  PLEURA: No pleural effusion.  VESSELS: Enlarged main pulmonary artery, measuring 3.9 cm. Atherosclerotic changes of the aorta.  HEART: Cardiomegaly. No pericardial effusion.  MEDIASTINUM AND RAIN: Subcentimeter short axis mediastinal lymph nodes.  CHEST WALL AND LOWER NECK: Within normal limits.    ABDOMEN AND PELVIS:    LIVER: Within normal limits.  BILE DUCTS: Normal caliber.  GALLBLADDER: Cholecystectomy.  SPLEEN: Within normal limits.  PANCREAS: Mild peripancreatic fat stranding.  ADRENALS: Within normal limits.  KIDNEYS/URETERS: Within normal limits.    BLADDER: Within normal limits.  REPRODUCTIVE ORGANS: Posterior uterine leiomyoma.    BOWEL: No bowel obstruction.   PERITONEUM: No ascites.  VESSELS: Atherosclerotic changes.  RETROPERITONEUM/LYMPH NODES: No lymphadenopathy.    ABDOMINAL WALL: Small fat-containing umbilical hernia. Small amount of air inferior to the hernia sac. Small bilateral fat-containing inguinal hernias.  BONES: Degenerative changes.    IMPRESSION:     Enlarged main pulmonary artery, possibly pulmonary arterial hypertension.    Possible mild pancreatitis.                AAMIR HENLEY M.D., ATTENDING RADIOLOGIST  This document has been electronically signed. May 18 2020  9:00PM                < end of copied text >

## 2020-05-25 NOTE — PROGRESS NOTE ADULT - ASSESSMENT
ob+  anemia  constipation  hf  pulm htn      ob+ stool chronic per pt  hx of neg egd/colon/capsule  no s/s active gib; HD stable  hgb stable sp transfusion  monitor cbc, transfuse prn  cont proton pump inhibitor  cont bowel regimen  diet as tolerated  monitor exam/gi fxn  further care per primary

## 2020-05-25 NOTE — PROGRESS NOTE ADULT - ASSESSMENT
The patient is a 70 year old female with a history of HTN, DM, chronic diastolic heart failure, COPD, pulmonary hypertension who presents with shortness of breath, likely multifactorial from COPD, chronic diastolic heart failure.    Plan:  - Echo with right-sided heart failure, severe TR and severe pulm HTN  - COVID-19 negative  - Continue torsemide 20 mg daily. Can be titrated up further as an outpatient.  - Hold spironolactone  - Hold metolazone  - Continue sildenafil 20 mg tid  - Continue diltiazem 120 mg daily  - Pulm follow-up  - Accelerated junctional rhythm - at times competing with sinus rhythm. Clinically insignificant.  - Patient to discuss with her outpatient physicians regarding evaluation at tertiary center with a pulmonary hypertension specialist  - Discharge planning

## 2020-05-25 NOTE — PROGRESS NOTE ADULT - SUBJECTIVE AND OBJECTIVE BOX
CHIEF COMPLAINT/INTERVAL HISTORY:  Pt. seen and evaluated for acute hypoxic respiratory failure 2/2 COPD  and pulmonary hypertension.  Pt. is in no distress.  Feels well.  On her home 6L nasal canula with no SOB.      REVIEW OF SYSTEMS:  No fever, CP, SOB, or abdominal pain.     Vital Signs Last 24 Hrs  T(C): 36.4 (25 May 2020 05:05), Max: 36.7 (24 May 2020 19:24)  T(F): 97.5 (25 May 2020 05:05), Max: 98.1 (24 May 2020 19:24)  HR: 87 (25 May 2020 05:05) (65 - 87)  BP: 115/73 (25 May 2020 05:05) (90/60 - 115/73)  BP(mean): --  RR: 17 (25 May 2020 05:05) (17 - 20)  SpO2: 96% (25 May 2020 05:05) (92% - 98%)    PHYSICAL EXAM:  GENERAL: NAD  HEENT: EOMI, hearing normal, conjunctiva and sclera clear  Chest: Diminished BS at bases, no wheezing  CV: S1S2, RRR,   GI: soft, +BS, NT/ND  Musculoskeletal: 1+ LE edema  Psychiatric: affect nL, mood nL  Skin: warm and dry    LABS:                        9.2    10.36 )-----------( 241      ( 25 May 2020 08:25 )             29.8     05-25    134<L>  |  94<L>  |  66<H>  ----------------------------<  150<H>  3.8   |  33<H>  |  1.40<H>    Ca    9.6      25 May 2020 08:25  Phos  3.0     05-24  Mg     2.1     05-25        Assessment and Plan:  -Acute hypoxic respiratory failure 2/2 COPD exacerbation and pulmonary HTN:  continue Prednisone taper, Duoneb tx Q6h, and Revatio 20mg PO TID.  Continue O2 support via nasal canula (Pt. reports using 6L NC at home).  Pulmonary f/u  -Abdominal pain/Constipation:  Improved.  Continue Senna 2tabs PO QHS, Dulcolax 5mg PO Q12h PRN, and Magnesium hydroxide 30mL PO daily PRN.   -Anemia:  stable.  stool OB positive.  Continue ferrous sulfate 325mg PO daily.  GI consult appreciated.  -Hyponatremia and MAURICIO:  improved.  Monitor while on diuretic.  Nephrology f/u    -HFpEF:  continue Torsemide 20mg PO daily.  Holding metolazone and spironolactone 2/2 MAURICIO.  Cardiology f/u  -Type 2 DM:  continue humalog sliding scale  -Hx of arrhythmia:  continue Cardizem CD 120mg PO daily  -VTE ppx:  SCD

## 2020-05-25 NOTE — PROGRESS NOTE ADULT - REASON FOR ADMISSION
acute hypoxemic respiratory failure

## 2020-05-25 NOTE — PROGRESS NOTE ADULT - PROBLEM SELECTOR PLAN 1
CPAP overnight - awake - verbal -   copd - jairo - ckd - anemia - HFpEF - Pulm HTN - HTN  spoke with dtr on this admission   pt follows with Nacho Blanco MD in [facility]   on Trelegy Inhaler at home - for now on systemic steroids and NEBS - due to Trelegy - not on formulary in the hospital and pt is unable to use Inhaler device at present  Steroids - NEBS - o2 support - CPAP QHS -   CPAP night time  [facility] Rx regimen optimization - revatio for Pulm HTN - may need to restart Diuresis   will benefit from follow up with Pulm HTN center - eg. Dr. Belem Bowman  CT chest and LE dopplers reviewed  discussed GOC and care plan with pt and dtr  pt is DNR DNI.
FOBT positive - overnight events noted - off AC at present - am H and H pending  CPAP overnight - awake - verbal -   copd - jairo - ckd - anemia - HFpEF - Pulm HTN - HTN  spoke with dtr on this admission   pt follows with Nacho Blanco MD in [facility]   on Trelegy Inhaler at home - for now on systemic steroids and NEBS - due to Trelegy - not on formulary in the hospital and pt is unable to use Inhaler device at present  Steroids - NEBS - o2 support - CPAP QHS - slow taper of steroids in progress  CPAP night time  [facility] Rx regimen optimization - revatio for Pulm HTN - may need to restart Diuresis   will benefit from follow up with Pulm HTN center - eg. Dr. Belem Bowman  CT chest and LE dopplers reviewed  discussed GOC and care plan with pt and dtr  pt is DNR DNI.
Started on Flonase -   GI eval noted   Overnight events noted - VS noted - abnormal rhythm reported -   off AC at present - am H and H pending - FOBT positive -   CPAP overnight - awake - verbal -   copd - jairo - ckd - anemia - HFpEF - Pulm HTN - HTN  spoke with dtr on this admission   pt follows with Nacho Blanco MD in [facility]   on Trelegy Inhaler at home - for now on systemic steroids and NEBS - due to Trelegy - not on formulary in the hospital and pt is unable to use Inhaler device at present  Steroids - NEBS - o2 support - CPAP QHS - slow taper of steroids in progress  CPAP night time  [facility] Rx regimen optimization - revatio for Pulm HTN - may need to restart Diuresis   will benefit from follow up with Pulm HTN center - eg. Dr. Belem Bowman  CT chest and LE dopplers reviewed  discussed GOC and care plan with pt and dtr  pt is DNR DNI.
abd pain - reports constipation -   CPAP overnight - awake - verbal -   copd - jairo - ckd - anemia - HFpEF - Pulm HTN - HTN  spoke with dtr on this admission   pt follows with Nacho Blanco MD in [facility]   on Trelegy Inhaler at home - for now on systemic steroids and NEBS - due to Trelegy - not on formulary in the hospital and pt is unable to use Inhaler device at present  Steroids - NEBS - o2 support - CPAP QHS - slow taper of steroids in progress  CPAP night time  [facility] Rx regimen optimization - revatio for Pulm HTN - may need to restart Diuresis   will benefit from follow up with Pulm HTN center - eg. Dr. Belem Bowman  CT chest and LE dopplers reviewed  discussed GOC and care plan with pt and dtr  pt is DNR DNI.
copd - jairo - ckd - anemia - HFpEF - Pulm HTN - HTN  spoke with dtr   pt follows with Nacho Blanco MD in [facility]   on Trelegy Inhaler at home - for now on NEBS - due to Trelegy - not on formulary in the hospital and pt is unable to use Inhaler device at present  Steroids - NEBS - o2 support  CPAP night time  [facility] Rx regimen optimization - on diuretic regimen, revatio for Pulm HTN  CT chest and LE dopplers reviewed  discussed GOC and care plan with pt and dtr  pt is DNR DNI
seen and examined - used CPAP overnight - awake - verbal -   copd - jairo - ckd - anemia - HFpEF - Pulm HTN - HTN  spoke with dtr   pt follows with Nacho Blanco MD in [facility]   on Trelegy Inhaler at home - for now on systemic steroids and NEBS - due to Trelegy - not on formulary in the hospital and pt is unable to use Inhaler device at present  Steroids - NEBS - o2 support - CPAP QHS -   CPAP night time  [facility] Rx regimen optimization - revatio for Pulm HTN - may need to restart Diuresis   will benefit from follow up with Pulm HTN center - eg. Dr. Belem Bowman  CT chest and LE dopplers reviewed  discussed GOC and care plan with pt and dtr  pt is DNR DNI.
Hypoxemic Respiratory Failure - DDx includes symptomatic anemia vs CHF, ?pulm HTN vs COPD exacerbation  - CXR clear; CT chest with possible pulm HTN  - Continue O2 support; down to NC and breathing comfortably today.  - Start Duonebs; on Trelegy at home however not on formulary   - Continue IV Solu-medrol per pulm.  - COVID-19 negative.  - F/u echo
Hypoxemic Respiratory Failure - DDx includes symptomatic anemia vs CHF, ?pulm HTN vs COPD exacerbation, most likely pulm related.   - CXR clear; CT chest with possible pulm HTN  - Continue O2 support; taper down oxygen as tolerated.   - Start Duonebs; on Trelegy at home however not on formulary   - Continue IV Solu-medrol per pulm.  - COVID-19 negative.  - TTE showed R side heart failure, severe TR, and pulm HTN.
Hypoxemic Respiratory Failure, likely multifactorial, COPD exacerbation, symptomatic anemia, pulm HTN, and less likely decompensation of heart failure.   - CXR clear; CT chest with possible pulm HTN  - Continue O2 support; taper down oxygen as tolerated.   - Continue Duonebs; on Trelegy at home however not on formulary   - Steroids changed to Prednisone today.  - COVID-19 negative.  - TTE showed R side heart failure, severe TR, and pulm HTN.
Hypoxemic Respiratory Failure, likely multifactorial, COPD exacerbation, symptomatic anemia, pulm HTN, and less likely decompensation of heart failure.   - CXR clear; CT chest with possible pulm HTN  - Continue O2 support; taper down oxygen as tolerated.   - Start Duonebs; on Trelegy at home however not on formulary   - Continue IV Solu-medrol per pulm.  - COVID-19 negative.  - TTE showed R side heart failure, severe TR, and pulm HTN.

## 2020-05-25 NOTE — PROGRESS NOTE ADULT - PROBLEM SELECTOR PROBLEM 1
Acute hypoxemic respiratory failure
COPD (chronic obstructive pulmonary disease)
COPD (chronic obstructive pulmonary disease)
Acute hypoxemic respiratory failure

## 2020-05-25 NOTE — PROGRESS NOTE ADULT - SUBJECTIVE AND OBJECTIVE BOX
Date/Time Patient Seen:  		  Referring MD:   Data Reviewed	       Patient is a 70y old  Female who presents with a chief complaint of acute hypoxemic respiratory failure (24 May 2020 13:47)      Subjective/HPI     PAST MEDICAL & SURGICAL HISTORY:  Hypertension  Type 2 diabetes mellitus  COPD with hypoxia  Pulmonary hypertension  History of Cholecystectomy  HTN (Hypertension)  Obstructive Sleep Apnea  COPD (Chronic Obstructive Pulmonary Disease)  Diabetes Mellitus Type II  S/P tubal ligation  S/P Cholecystectomy        Medication list         MEDICATIONS  (STANDING):  albuterol/ipratropium for Nebulization 3 milliLiter(s) Nebulizer every 6 hours  dextrose 5%. 1000 milliLiter(s) (50 mL/Hr) IV Continuous <Continuous>  dextrose 5%. 1000 milliLiter(s) (50 mL/Hr) IV Continuous <Continuous>  dextrose 50% Injectable 12.5 Gram(s) IV Push once  dextrose 50% Injectable 25 Gram(s) IV Push once  dextrose 50% Injectable 25 Gram(s) IV Push once  dextrose 50% Injectable 12.5 Gram(s) IV Push once  dextrose 50% Injectable 25 Gram(s) IV Push once  dextrose 50% Injectable 25 Gram(s) IV Push once  diltiazem    milliGRAM(s) Oral daily  ferrous    sulfate 325 milliGRAM(s) Oral daily  fluticasone propionate 50 MICROgram(s)/spray Nasal Spray 1 Spray(s) Both Nostrils two times a day  folic acid 1 milliGRAM(s) Oral daily  insulin lispro (HumaLOG) corrective regimen sliding scale   SubCutaneous at bedtime  insulin lispro (HumaLOG) corrective regimen sliding scale   SubCutaneous three times a day before meals  multivitamin 1 Tablet(s) Oral daily  pantoprazole  Injectable 40 milliGRAM(s) IV Push every 12 hours  polyethylene glycol 3350 17 Gram(s) Oral daily  predniSONE   Tablet 20 milliGRAM(s) Oral daily  senna 2 Tablet(s) Oral at bedtime  sertraline 100 milliGRAM(s) Oral daily  sildenafil (REVATIO) 20 milliGRAM(s) Oral three times a day  torsemide 20 milliGRAM(s) Oral daily    MEDICATIONS  (PRN):  acetaminophen   Tablet .. 650 milliGRAM(s) Oral every 6 hours PRN Mild Pain (1 - 3), Moderate Pain (4 - 6)  bisacodyl 5 milliGRAM(s) Oral every 12 hours PRN Constipation  dextrose 40% Gel 15 Gram(s) Oral once PRN Blood Glucose LESS THAN 70 milliGRAM(s)/deciliter  dextrose 40% Gel 15 Gram(s) Oral once PRN Blood Glucose LESS THAN 70 milliGRAM(s)/deciliter  glucagon  Injectable 1 milliGRAM(s) IntraMuscular once PRN Glucose LESS THAN 70 milligrams/deciliter  glucagon  Injectable 1 milliGRAM(s) IntraMuscular once PRN Glucose LESS THAN 70 milligrams/deciliter  magnesium hydroxide Suspension 30 milliLiter(s) Oral daily PRN Constipation         Vitals log        ICU Vital Signs Last 24 Hrs  T(C): 36.4 (25 May 2020 05:05), Max: 36.7 (24 May 2020 19:24)  T(F): 97.5 (25 May 2020 05:05), Max: 98.1 (24 May 2020 19:24)  HR: 87 (25 May 2020 05:05) (65 - 91)  BP: 115/73 (25 May 2020 05:05) (90/60 - 115/73)  BP(mean): --  ABP: --  ABP(mean): --  RR: 17 (25 May 2020 05:05) (17 - 20)  SpO2: 96% (25 May 2020 05:05) (90% - 98%)           Input and Output:  I&O's Detail    23 May 2020 07:01  -  24 May 2020 07:00  --------------------------------------------------------  IN:    Oral Fluid: 420 mL  Total IN: 420 mL    OUT:    Voided: 500 mL  Total OUT: 500 mL    Total NET: -80 mL      24 May 2020 07:01  -  25 May 2020 06:13  --------------------------------------------------------  IN:  Total IN: 0 mL    OUT:    Incontinent per Collection Ba mL  Total OUT: 750 mL    Total NET: -750 mL          Lab Data                        9.5    10.67 )-----------( 247      ( 24 May 2020 08:59 )             30.5     05-24    132<L>  |  93<L>  |  70<H>  ----------------------------<  164<H>  4.5   |  31  |  1.50<H>    Ca    9.9      24 May 2020 20:50  Phos  3.0     05-  Mg     2.0                   Review of Systems	      Objective     Physical Examination    heart s1s2  lung dc BS  abd soft  on o2 support      Pertinent Lab findings & Imaging      Billy:  NO   Adequate UO     I&O's Detail    23 May 2020 07:  -  24 May 2020 07:00  --------------------------------------------------------  IN:    Oral Fluid: 420 mL  Total IN: 420 mL    OUT:    Voided: 500 mL  Total OUT: 500 mL    Total NET: -80 mL      24 May 2020 07:  -  25 May 2020 06:13  --------------------------------------------------------  IN:  Total IN: 0 mL    OUT:    Incontinent per Collection Ba mL  Total OUT: 750 mL    Total NET: -750 mL               Discussed with:     Cultures:	        Radiology

## 2022-01-17 NOTE — PROGRESS NOTE ADULT - PROBLEM SELECTOR PROBLEM 7
Received lab results pt troponin is 1.4, [facility] Dr Mooney called and advised of lab results. Stated if she is pain free to let it be and he will see her in a couple hours at [facility]. NNO noted  
Spoke with [facility] nurse and pt is needing to be down at [facility] for heart cath at 10 AM, Dr Dumont will be doing the procedure.   
COPD (chronic obstructive pulmonary disease)
Chronic heart failure

## 2022-08-31 NOTE — PROGRESS NOTE ADULT - PROBLEM/PLAN-9
FUTURE VISIT INFORMATION      SURGERY INFORMATION:    Date: 22- Cardio    Consult: ov 22    RECORDS REQUESTED FROM:       Primary Care Provider: Shiv Gomez MD- [facility]    Most recent EKG+ [id number]    Most recent ECHO: 22    Most recent Cardiac Stress Test: 18- [facility]    Most recent Coronary Angiogram: 22- [facility]    Most recent PFT's: 22- [facility]     DISPLAY PLAN FREE TEXT

## 2022-12-31 NOTE — PROGRESS NOTE ADULT - PROVIDER SPECIALTY LIST ADULT
Cardiology
Gastroenterology
Hospitalist
Nephrology
Pulmonology
0

## 2023-01-27 NOTE — CHART NOTE - NSCHARTNOTEFT_GEN_A_CORE
Notified by RN that FOBT +    Vital Signs Last 24 Hrs  T(C): 36.3 (23 May 2020 20:48), Max: 36.7 (23 May 2020 14:20)  T(F): 97.3 (23 May 2020 20:48), Max: 98 (23 May 2020 14:20)  HR: 86 (23 May 2020 20:48) (75 - 90)  BP: 98/58 (23 May 2020 20:48) (98/58 - 127/63)  BP(mean): --  RR: 20 (23 May 2020 20:48) (19 - 20)  SpO2: 93% (23 May 2020 20:48) (93% - 96%)    A/P: The patient is a 70 year old female with a history of HTN, DM, chronic diastolic heart failure, COPD, pulmonary hypertension who presents with shortness of breath, likely multifactorial from COPD, chronic diastolic heart failure. Notified by RN that FOBT is positive    - Patient HDS  - Patient has not had melena or hematochezia  - Anemia stable  - Continue protonix IV BID  - Trend H/h daily  - Will continue to monitor, RN to call with any changes 1 Principal Discharge DX:	Abdominal pain

## 2024-11-19 NOTE — ED ADULT NURSE NOTE - NEURO MENTATION
Encouraged to continue healthy diet and exercise habits  No early cancer screening indicated  Screening labs ordered and pending   Patient declined flu and COVID vaccines, but agreeable to MenB vaccine - done during visit   STI screening done and pending    normal